# Patient Record
Sex: MALE | Race: WHITE | NOT HISPANIC OR LATINO | Employment: FULL TIME | URBAN - METROPOLITAN AREA
[De-identification: names, ages, dates, MRNs, and addresses within clinical notes are randomized per-mention and may not be internally consistent; named-entity substitution may affect disease eponyms.]

---

## 2018-01-16 ENCOUNTER — CONSULT (OUTPATIENT)
Dept: ONCOLOGY | Facility: CLINIC | Age: 43
End: 2018-01-16

## 2018-01-16 ENCOUNTER — LAB (OUTPATIENT)
Dept: LAB | Facility: HOSPITAL | Age: 43
End: 2018-01-16

## 2018-01-16 VITALS
HEART RATE: 91 BPM | WEIGHT: 283.2 LBS | RESPIRATION RATE: 16 BRPM | BODY MASS INDEX: 36.35 KG/M2 | OXYGEN SATURATION: 96 % | TEMPERATURE: 97.6 F | HEIGHT: 74 IN | SYSTOLIC BLOOD PRESSURE: 157 MMHG | DIASTOLIC BLOOD PRESSURE: 97 MMHG

## 2018-01-16 DIAGNOSIS — D72.825 BANDEMIA: Primary | ICD-10-CM

## 2018-01-16 DIAGNOSIS — D72.828 OTHER ELEVATED WHITE BLOOD CELL (WBC) COUNT: Primary | ICD-10-CM

## 2018-01-16 LAB
BASOPHILS # BLD AUTO: 0.06 10*3/MM3 (ref 0–0.2)
BASOPHILS NFR BLD AUTO: 0.5 % (ref 0–2)
DEPRECATED RDW RBC AUTO: 42.3 FL (ref 37–54)
EOSINOPHIL # BLD AUTO: 0.32 10*3/MM3 (ref 0.1–0.3)
EOSINOPHIL NFR BLD AUTO: 2.4 % (ref 0–4)
ERYTHROCYTE [DISTWIDTH] IN BLOOD BY AUTOMATED COUNT: 13 % (ref 11.5–14.5)
ERYTHROCYTE [SEDIMENTATION RATE] IN BLOOD: 4 MM/HR (ref 0–20)
HCT VFR BLD AUTO: 46.5 % (ref 42–52)
HGB BLD-MCNC: 16.5 G/DL (ref 14–18)
IMM GRANULOCYTES # BLD: 0.29 10*3/MM3 (ref 0–0.03)
IMM GRANULOCYTES NFR BLD: 2.2 % (ref 0–0.5)
LDH SERPL-CCNC: 197 U/L (ref 135–225)
LYMPHOCYTES # BLD AUTO: 2.92 10*3/MM3 (ref 0.6–4.8)
LYMPHOCYTES NFR BLD AUTO: 22 % (ref 20–45)
MCH RBC QN AUTO: 31.6 PG (ref 27–31)
MCHC RBC AUTO-ENTMCNC: 35.5 G/DL (ref 31–37)
MCV RBC AUTO: 89.1 FL (ref 80–94)
MONOCYTES # BLD AUTO: 0.77 10*3/MM3 (ref 0–1)
MONOCYTES NFR BLD AUTO: 5.8 % (ref 3–8)
NEUTROPHILS # BLD AUTO: 8.89 10*3/MM3 (ref 1.5–8.3)
NEUTROPHILS NFR BLD AUTO: 67.1 % (ref 45–70)
NRBC BLD MANUAL-RTO: 0 /100 WBC (ref 0–0)
PLATELET # BLD AUTO: 323 10*3/MM3 (ref 140–500)
PMV BLD AUTO: 9.1 FL (ref 7.4–10.4)
RBC # BLD AUTO: 5.22 10*6/MM3 (ref 4.7–6.1)
WBC NRBC COR # BLD: 13.25 10*3/MM3 (ref 4.8–10.8)

## 2018-01-16 PROCEDURE — 99244 OFF/OP CNSLTJ NEW/EST MOD 40: CPT | Performed by: INTERNAL MEDICINE

## 2018-01-16 PROCEDURE — 88184 FLOWCYTOMETRY/ TC 1 MARKER: CPT | Performed by: INTERNAL MEDICINE

## 2018-01-16 PROCEDURE — 88189 FLOWCYTOMETRY/READ 16 & >: CPT | Performed by: INTERNAL MEDICINE

## 2018-01-16 PROCEDURE — 83615 LACTATE (LD) (LDH) ENZYME: CPT | Performed by: INTERNAL MEDICINE

## 2018-01-16 PROCEDURE — 85025 COMPLETE CBC W/AUTO DIFF WBC: CPT | Performed by: INTERNAL MEDICINE

## 2018-01-16 PROCEDURE — 36415 COLL VENOUS BLD VENIPUNCTURE: CPT | Performed by: INTERNAL MEDICINE

## 2018-01-16 PROCEDURE — 88185 FLOWCYTOMETRY/TC ADD-ON: CPT | Performed by: INTERNAL MEDICINE

## 2018-01-16 PROCEDURE — 88275 CYTOGENETICS 100-300: CPT | Performed by: INTERNAL MEDICINE

## 2018-01-16 PROCEDURE — 85652 RBC SED RATE AUTOMATED: CPT | Performed by: INTERNAL MEDICINE

## 2018-01-16 PROCEDURE — 88271 CYTOGENETICS DNA PROBE: CPT | Performed by: INTERNAL MEDICINE

## 2018-01-16 RX ORDER — LEVOTHYROXINE SODIUM 0.03 MG/1
25 TABLET ORAL DAILY
COMMUNITY
Start: 2017-12-30 | End: 2018-06-04 | Stop reason: HOSPADM

## 2018-01-16 RX ORDER — AMLODIPINE BESYLATE 10 MG/1
10 TABLET ORAL DAILY
COMMUNITY
End: 2020-07-28

## 2018-01-16 RX ORDER — LORATADINE 10 MG/1
10 TABLET ORAL DAILY
COMMUNITY
Start: 2018-01-10 | End: 2020-07-28

## 2018-01-16 RX ORDER — FENOFIBRATE 160 MG/1
160 TABLET ORAL DAILY
COMMUNITY
Start: 2017-12-30 | End: 2020-07-28

## 2018-01-16 RX ORDER — LISINOPRIL 10 MG/1
10 TABLET ORAL DAILY
COMMUNITY
Start: 2018-01-10

## 2018-01-16 RX ORDER — HYDROCHLOROTHIAZIDE 12.5 MG/1
12.5 TABLET ORAL DAILY
COMMUNITY
Start: 2017-12-02 | End: 2018-06-04 | Stop reason: HOSPADM

## 2018-01-16 RX ORDER — LEVOTHYROXINE SODIUM 0.2 MG/1
200 TABLET ORAL DAILY
COMMUNITY
Start: 2017-12-30 | End: 2018-06-04 | Stop reason: HOSPADM

## 2018-01-16 RX ORDER — TESTOSTERONE CYPIONATE 200 MG/ML
INJECTION, SOLUTION INTRAMUSCULAR
Status: ON HOLD | COMMUNITY
Start: 2017-12-06 | End: 2018-05-29

## 2018-01-16 RX ORDER — DULOXETIN HYDROCHLORIDE 60 MG/1
60 CAPSULE, DELAYED RELEASE ORAL DAILY
COMMUNITY
Start: 2018-01-10 | End: 2020-07-28

## 2018-01-16 NOTE — PROGRESS NOTES
Subjective     REASON FOR CONSULTATION:  leukocytosis  Provide an opinion on any further workup or treatment                             REQUESTING PRACTITIONER:  Arpan    RECORDS OBTAINED:  Records of the patients history including those obtained from the referring provider were reviewed and summarized in detail.      History of Present Illness   This is a 42-year-old man with obesity, diabetes mellitus, hyper-cholesterolemia, reported history of rheumatoid arthritis and ongoing tobacco abuse.  He is seen for evaluation of mild leukocytosis.  A CBC reviewed from 9/14/17 inserted a white blood cell count of 13.0 with mild neutrophilia, no polycythemia or thrombocytosis.  A repeat CBC on 12/6/17 again showed a mild leukocytosis with white blood cell count of 14.25 again with mild neutrophilia.  The patient has had no recent significant infections.  He denies fever.  Denies skin rash.  He complains of fluctuating weight and fatigue.  He complains of significant joint discomfort most prominently affecting the hands, elbows, shoulders and hips.  He states he was diagnosed with rheumatoid arthritis for 5 years ago but has never been formally evaluated by rheumatology.  He currently smokes one to 2 packs of tobacco a day and has smoked since the age of 17.  He denies significant shortness of breath but has a chronic nonproductive cough.    Past Medical History:   Diagnosis Date   • Adjustment disorder with mixed anxiety and depressed mood    • Diabetes mellitus     Type 2 w/o complications   • Fatigue    • H/O Testicular hypofunction    • Hypertension    • Hypothyroidism    • Morbid obesity    • Osteoarthritis     Knee   • Rheumatoid arthritis    • Sleep apnea    • Vitamin D deficiency         Past Surgical History:   Procedure Laterality Date   • APPENDECTOMY  2003   • FOOT NEUROMA SURGERY Right 2016, 2017        No current outpatient prescriptions on file prior to visit.     No current facility-administered  "medications on file prior to visit.         ALLERGIES:    Allergies   Allergen Reactions   • Penicillins      Respiratory distress        Social History     Social History   • Marital status:      Spouse name: Mei   • Number of children: 3   • Years of education: GED     Occupational History   •  [employer]     Social History Main Topics   • Smoking status: Current Every Day Smoker     Packs/day: 1.00     Types: Cigarettes   • Smokeless tobacco: None      Comment: Started at age 17   • Alcohol use No   • Drug use: No   • Sexual activity: Not Asked     Other Topics Concern   • None     Social History Narrative        Family History   Problem Relation Age of Onset   • No Known Problems Sister    • No Known Problems Daughter    • No Known Problems Son    • No Known Problems Sister    • No Known Problems Daughter         Review of Systems   Constitutional: Positive for fatigue. Negative for diaphoresis, fever and unexpected weight change.   HENT: Negative.    Respiratory: Positive for cough.    Gastrointestinal: Negative.    Genitourinary: Positive for difficulty urinating and frequency.   Musculoskeletal: Positive for arthralgias and joint swelling.   Skin: Negative.    Neurological: Negative.    Hematological: Negative.    Psychiatric/Behavioral: Negative.          Objective     Vitals:    01/16/18 1412   BP: 157/97   Pulse: 91   Resp: 16   Temp: 97.6 °F (36.4 °C)   TempSrc: Oral   SpO2: 96%   Weight: 128 kg (283 lb 3.2 oz)   Height: 187 cm (73.62\")   PainSc: 2  Comment: right elbow     Current Status 1/16/2018   ECOG score 0       Physical Exam    HEENT: no icterus, no thrush, alopecia noted  NECK: thick, no jvd  LN: no cervical, supraclavicular lad  CHEST: CTA bilat, no wheezing  CV: RRR, S1S2  ABD: obese, soft, nontender, nondistended, no masses  MS: no edema  SKIN: no petechiae or bruising  NEURO: normal strength, alert and oriented    RECENT LABS:  Hematology WBC   Date Value Ref Range " Status   01/16/2018 13.25 (H) 4.80 - 10.80 10*3/mm3 Final     RBC   Date Value Ref Range Status   01/16/2018 5.22 4.70 - 6.10 10*6/mm3 Final     Hemoglobin   Date Value Ref Range Status   01/16/2018 16.5 14.0 - 18.0 g/dL Final     Hematocrit   Date Value Ref Range Status   01/16/2018 46.5 42.0 - 52.0 % Final     Platelets   Date Value Ref Range Status   01/16/2018 323 140 - 500 10*3/mm3 Final          Assessment/Plan     This is a 42-year-old man with mild leukocytosis in the absence of polycythemia or thrombocytosis.  The white blood cell differential today shows slight neutrophilia 8.8 and slight increase in eosinophils 0.32 and immature grand 0.29.  The differential diagnosis in this setting would be very broad and most likely not related to an underlying bone marrow disorder.  The patient has metabolic syndrome, reported history of rheumatoid arthritis, and smokes 1-2 packs of tobacco a day any of which could cause a leukocytosis of this degree.    To further exclude primary bone marrow disorders, I recommended that we check a peripheral blood flow cytometry and FISH for BCR-ABL given the chronicity of the leukocytosis.  I will also check a sedimentation rate an LDH.  The patient wanted to be called with results which I will do.    I strongly advised the patient to stop smoking, work on weight control and address but of his metabolic syndrome.  I recommended evaluation by rheumatology to confirm the diagnosis of rheumatoid arthritis and treat if indicated.    Addendum: Labs reviewed including sedimentation rate, LDH, ACR-ABL and peripheral blood flow cytometry are unremarkable with no evidence of a myeloid or lymphoid disorder.  The patient will be updated by phone negative results.

## 2018-01-31 ENCOUNTER — TELEPHONE (OUTPATIENT)
Dept: ONCOLOGY | Facility: HOSPITAL | Age: 43
End: 2018-01-31

## 2018-01-31 NOTE — TELEPHONE ENCOUNTER
----- Message from Matthew Hannon MD sent at 1/31/2018 10:44 AM EST -----  Please let this patient know that all of his blood testing came back normal other than the mild elevated white blood cell count which he is aware of.        Tried calling pt at both numbers. Went straight to voicemail and states that the mailbox hasnt been set up.

## 2018-02-12 LAB
REF LAB TEST METHOD: NORMAL
REF LAB TEST METHOD: NORMAL

## 2018-05-28 ENCOUNTER — APPOINTMENT (OUTPATIENT)
Dept: CT IMAGING | Facility: HOSPITAL | Age: 43
End: 2018-05-28

## 2018-05-28 ENCOUNTER — HOSPITAL ENCOUNTER (OUTPATIENT)
Facility: HOSPITAL | Age: 43
Setting detail: OBSERVATION
End: 2018-05-29
Attending: EMERGENCY MEDICINE | Admitting: INTERNAL MEDICINE

## 2018-05-28 ENCOUNTER — APPOINTMENT (OUTPATIENT)
Dept: GENERAL RADIOLOGY | Facility: HOSPITAL | Age: 43
End: 2018-05-28

## 2018-05-28 DIAGNOSIS — R04.2 HEMOPTYSIS: Primary | ICD-10-CM

## 2018-05-28 DIAGNOSIS — I31.39 PERICARDIAL EFFUSION: ICD-10-CM

## 2018-05-28 LAB
ALBUMIN SERPL-MCNC: 5 G/DL (ref 3.5–5.2)
ALBUMIN/GLOB SERPL: 2.5 G/DL
ALP SERPL-CCNC: 60 U/L (ref 40–129)
ALT SERPL W P-5'-P-CCNC: 67 U/L (ref 5–41)
ANION GAP SERPL CALCULATED.3IONS-SCNC: 13.2 MMOL/L
AST SERPL-CCNC: 68 U/L (ref 5–40)
BASOPHILS # BLD AUTO: 0.08 10*3/MM3 (ref 0–0.2)
BASOPHILS NFR BLD AUTO: 0.6 % (ref 0–2)
BILIRUB SERPL-MCNC: 0.8 MG/DL (ref 0.2–1.2)
BUN BLD-MCNC: 14 MG/DL (ref 6–20)
BUN/CREAT SERPL: 9.5 (ref 7–25)
CALCIUM SPEC-SCNC: 10 MG/DL (ref 8.6–10.5)
CHLORIDE SERPL-SCNC: 99 MMOL/L (ref 98–107)
CO2 SERPL-SCNC: 31.8 MMOL/L (ref 22–29)
CREAT BLD-MCNC: 1.48 MG/DL (ref 0.76–1.27)
D DIMER PPP FEU-MCNC: <0.3 MCGFEU/ML (ref 0–0.46)
DEPRECATED RDW RBC AUTO: 46.5 FL (ref 37–54)
EOSINOPHIL # BLD AUTO: 0.5 10*3/MM3 (ref 0.1–0.3)
EOSINOPHIL NFR BLD AUTO: 3.8 % (ref 0–4)
ERYTHROCYTE [DISTWIDTH] IN BLOOD BY AUTOMATED COUNT: 13.3 % (ref 11.5–14.5)
GFR SERPL CREATININE-BSD FRML MDRD: 52 ML/MIN/1.73
GLOBULIN UR ELPH-MCNC: 2 GM/DL
GLUCOSE BLD-MCNC: 158 MG/DL (ref 65–99)
GLUCOSE BLDC GLUCOMTR-MCNC: 157 MG/DL (ref 70–130)
HCT VFR BLD AUTO: 44.8 % (ref 42–52)
HGB BLD-MCNC: 15.6 G/DL (ref 14–18)
IMM GRANULOCYTES # BLD: 0.07 10*3/MM3 (ref 0–0.03)
IMM GRANULOCYTES NFR BLD: 0.5 % (ref 0–0.5)
LYMPHOCYTES # BLD AUTO: 3.57 10*3/MM3 (ref 0.6–4.8)
LYMPHOCYTES NFR BLD AUTO: 27.4 % (ref 20–45)
MCH RBC QN AUTO: 33.1 PG (ref 27–31)
MCHC RBC AUTO-ENTMCNC: 34.8 G/DL (ref 31–37)
MCV RBC AUTO: 94.9 FL (ref 80–94)
MONOCYTES # BLD AUTO: 0.61 10*3/MM3 (ref 0–1)
MONOCYTES NFR BLD AUTO: 4.7 % (ref 3–8)
NEUTROPHILS # BLD AUTO: 8.18 10*3/MM3 (ref 1.5–8.3)
NEUTROPHILS NFR BLD AUTO: 63 % (ref 45–70)
NRBC BLD MANUAL-RTO: 0 /100 WBC (ref 0–0)
NT-PROBNP SERPL-MCNC: 81 PG/ML (ref 5–125)
PLATELET # BLD AUTO: 284 10*3/MM3 (ref 140–500)
PMV BLD AUTO: 9.8 FL (ref 7.4–10.4)
POTASSIUM BLD-SCNC: 4.1 MMOL/L (ref 3.5–5.2)
PROT SERPL-MCNC: 7 G/DL (ref 6–8.5)
RBC # BLD AUTO: 4.72 10*6/MM3 (ref 4.7–6.1)
SODIUM BLD-SCNC: 144 MMOL/L (ref 136–145)
TROPONIN T SERPL-MCNC: 0.02 NG/ML (ref 0–0.03)
WBC NRBC COR # BLD: 13.01 10*3/MM3 (ref 4.8–10.8)

## 2018-05-28 PROCEDURE — G0378 HOSPITAL OBSERVATION PER HR: HCPCS

## 2018-05-28 PROCEDURE — 93010 ELECTROCARDIOGRAM REPORT: CPT | Performed by: INTERNAL MEDICINE

## 2018-05-28 PROCEDURE — 84484 ASSAY OF TROPONIN QUANT: CPT | Performed by: EMERGENCY MEDICINE

## 2018-05-28 PROCEDURE — 99284 EMERGENCY DEPT VISIT MOD MDM: CPT

## 2018-05-28 PROCEDURE — 83880 ASSAY OF NATRIURETIC PEPTIDE: CPT | Performed by: EMERGENCY MEDICINE

## 2018-05-28 PROCEDURE — 85379 FIBRIN DEGRADATION QUANT: CPT | Performed by: EMERGENCY MEDICINE

## 2018-05-28 PROCEDURE — 82962 GLUCOSE BLOOD TEST: CPT

## 2018-05-28 PROCEDURE — 99285 EMERGENCY DEPT VISIT HI MDM: CPT | Performed by: EMERGENCY MEDICINE

## 2018-05-28 PROCEDURE — 85025 COMPLETE CBC W/AUTO DIFF WBC: CPT | Performed by: EMERGENCY MEDICINE

## 2018-05-28 PROCEDURE — 0 IOPAMIDOL PER 1 ML: Performed by: EMERGENCY MEDICINE

## 2018-05-28 PROCEDURE — 96360 HYDRATION IV INFUSION INIT: CPT

## 2018-05-28 PROCEDURE — 80053 COMPREHEN METABOLIC PANEL: CPT | Performed by: EMERGENCY MEDICINE

## 2018-05-28 PROCEDURE — 94799 UNLISTED PULMONARY SVC/PX: CPT

## 2018-05-28 PROCEDURE — 71046 X-RAY EXAM CHEST 2 VIEWS: CPT

## 2018-05-28 PROCEDURE — 99220 PR INITIAL OBSERVATION CARE/DAY 70 MINUTES: CPT | Performed by: INTERNAL MEDICINE

## 2018-05-28 PROCEDURE — 71275 CT ANGIOGRAPHY CHEST: CPT

## 2018-05-28 PROCEDURE — 96361 HYDRATE IV INFUSION ADD-ON: CPT

## 2018-05-28 PROCEDURE — 93005 ELECTROCARDIOGRAM TRACING: CPT | Performed by: EMERGENCY MEDICINE

## 2018-05-28 RX ORDER — ONDANSETRON 2 MG/ML
4 INJECTION INTRAMUSCULAR; INTRAVENOUS EVERY 6 HOURS PRN
Status: DISCONTINUED | OUTPATIENT
Start: 2018-05-28 | End: 2018-05-29 | Stop reason: HOSPADM

## 2018-05-28 RX ORDER — ACETAMINOPHEN 325 MG/1
650 TABLET ORAL EVERY 4 HOURS PRN
Status: DISCONTINUED | OUTPATIENT
Start: 2018-05-28 | End: 2018-05-29 | Stop reason: HOSPADM

## 2018-05-28 RX ORDER — NICOTINE POLACRILEX 4 MG
15 LOZENGE BUCCAL
Status: DISCONTINUED | OUTPATIENT
Start: 2018-05-28 | End: 2018-05-29 | Stop reason: HOSPADM

## 2018-05-28 RX ORDER — ALUMINA, MAGNESIA, AND SIMETHICONE 2400; 2400; 240 MG/30ML; MG/30ML; MG/30ML
15 SUSPENSION ORAL EVERY 6 HOURS PRN
Status: DISCONTINUED | OUTPATIENT
Start: 2018-05-28 | End: 2018-05-29 | Stop reason: HOSPADM

## 2018-05-28 RX ORDER — DEXTROSE MONOHYDRATE 25 G/50ML
25 INJECTION, SOLUTION INTRAVENOUS
Status: DISCONTINUED | OUTPATIENT
Start: 2018-05-28 | End: 2018-05-29 | Stop reason: HOSPADM

## 2018-05-28 RX ORDER — SODIUM CHLORIDE 0.9 % (FLUSH) 0.9 %
10 SYRINGE (ML) INJECTION AS NEEDED
Status: DISCONTINUED | OUTPATIENT
Start: 2018-05-28 | End: 2018-05-29 | Stop reason: HOSPADM

## 2018-05-28 RX ORDER — ACETAMINOPHEN 160 MG/5ML
650 SOLUTION ORAL EVERY 4 HOURS PRN
Status: DISCONTINUED | OUTPATIENT
Start: 2018-05-28 | End: 2018-05-29 | Stop reason: HOSPADM

## 2018-05-28 RX ORDER — SODIUM CHLORIDE 0.9 % (FLUSH) 0.9 %
1-10 SYRINGE (ML) INJECTION AS NEEDED
Status: DISCONTINUED | OUTPATIENT
Start: 2018-05-28 | End: 2018-05-29 | Stop reason: HOSPADM

## 2018-05-28 RX ORDER — SODIUM CHLORIDE 9 MG/ML
40 INJECTION, SOLUTION INTRAVENOUS AS NEEDED
Status: DISCONTINUED | OUTPATIENT
Start: 2018-05-28 | End: 2018-05-29 | Stop reason: HOSPADM

## 2018-05-28 RX ORDER — NICOTINE 21 MG/24HR
1 PATCH, TRANSDERMAL 24 HOURS TRANSDERMAL EVERY 24 HOURS
Status: DISCONTINUED | OUTPATIENT
Start: 2018-05-28 | End: 2018-05-29 | Stop reason: HOSPADM

## 2018-05-28 RX ADMIN — IOPAMIDOL 100 ML: 755 INJECTION, SOLUTION INTRAVENOUS at 21:03

## 2018-05-28 RX ADMIN — IOPAMIDOL 50 ML: 755 INJECTION, SOLUTION INTRAVENOUS at 21:03

## 2018-05-28 RX ADMIN — SODIUM CHLORIDE 1000 ML: 9 INJECTION, SOLUTION INTRAVENOUS at 19:45

## 2018-05-29 ENCOUNTER — APPOINTMENT (OUTPATIENT)
Dept: CARDIOLOGY | Facility: HOSPITAL | Age: 43
End: 2018-05-29
Attending: INTERNAL MEDICINE

## 2018-05-29 ENCOUNTER — HOSPITAL ENCOUNTER (INPATIENT)
Facility: HOSPITAL | Age: 43
LOS: 6 days | Discharge: HOME | End: 2018-06-04
Attending: INTERNAL MEDICINE | Admitting: INTERNAL MEDICINE

## 2018-05-29 VITALS
RESPIRATION RATE: 16 BRPM | HEART RATE: 87 BPM | BODY MASS INDEX: 37.77 KG/M2 | SYSTOLIC BLOOD PRESSURE: 151 MMHG | HEIGHT: 73 IN | OXYGEN SATURATION: 98 % | WEIGHT: 285 LBS | DIASTOLIC BLOOD PRESSURE: 94 MMHG | TEMPERATURE: 98.2 F

## 2018-05-29 DIAGNOSIS — G47.33 OBSTRUCTIVE SLEEP APNEA SYNDROME: Primary | ICD-10-CM

## 2018-05-29 DIAGNOSIS — G47.34 NOCTURNAL HYPOXEMIA: ICD-10-CM

## 2018-05-29 PROBLEM — I42.9 CARDIOMYOPATHY (HCC): Status: ACTIVE | Noted: 2018-05-29

## 2018-05-29 PROBLEM — R07.9 CHEST PAIN: Status: ACTIVE | Noted: 2018-05-29

## 2018-05-29 PROBLEM — R04.2 HEMOPTYSIS: Status: ACTIVE | Noted: 2018-05-29

## 2018-05-29 PROBLEM — E11.9 TYPE 2 DIABETES MELLITUS (HCC): Status: ACTIVE | Noted: 2018-05-29

## 2018-05-29 PROBLEM — E03.9 ACQUIRED HYPOTHYROIDISM: Status: ACTIVE | Noted: 2018-05-29

## 2018-05-29 PROBLEM — N17.9 AKI (ACUTE KIDNEY INJURY) (HCC): Status: ACTIVE | Noted: 2018-05-29

## 2018-05-29 PROBLEM — D72.825 BANDEMIA: Status: RESOLVED | Noted: 2018-01-16 | Resolved: 2018-05-29

## 2018-05-29 PROBLEM — L63.0 ALOPECIA TOTALIS: Status: ACTIVE | Noted: 2018-05-29

## 2018-05-29 PROBLEM — M06.9 RHEUMATOID ARTHRITIS (HCC): Status: ACTIVE | Noted: 2018-05-29

## 2018-05-29 LAB
ANION GAP SERPL CALCULATED.3IONS-SCNC: 10.3 MMOL/L
ANION GAP SERPL CALCULATED.3IONS-SCNC: 9.3 MMOL/L
AORTIC ARCH: 3 CM
ASCENDING AORTA: 2.8 CM
B PERT DNA SPEC QL NAA+PROBE: NOT DETECTED
BASOPHILS # BLD AUTO: 0.07 10*3/MM3 (ref 0–0.2)
BASOPHILS NFR BLD AUTO: 0.5 % (ref 0–2)
BH CV ECHO MEAS - AO MAX PG: 4 MMHG
BH CV ECHO MEAS - AO MEAN PG: 2 MMHG
BH CV ECHO MEAS - AO V2 MAX: 97 CM/SEC
BH CV ECHO MEAS - AO V2 VTI: 17 CM
BH CV ECHO MEAS - EDV(MOD-SP2): 120 ML
BH CV ECHO MEAS - EDV(MOD-SP4): 115 ML
BH CV ECHO MEAS - EF(MOD-BP): 30 %
BH CV ECHO MEAS - EF(MOD-SP4): 31 %
BH CV ECHO MEAS - ESV(MOD-SP2): 71 ML
BH CV ECHO MEAS - ESV(MOD-SP4): 80 ML
BH CV ECHO MEAS - FS: 15 %
BH CV ECHO MEAS - IVS/LVPW: 1 CM
BH CV ECHO MEAS - IVSD: 1.4 CM
BH CV ECHO MEAS - LAT PEAK E' VEL: 4 CM/SEC
BH CV ECHO MEAS - LV MASS(C)D: 287.8 GRAMS
BH CV ECHO MEAS - LV MAX PG: 1.7 MMHG
BH CV ECHO MEAS - LV MEAN PG: 1 MMHG
BH CV ECHO MEAS - LV V1 MAX: 66 CM/SEC
BH CV ECHO MEAS - LV V1 VTI: 12 CM
BH CV ECHO MEAS - LVIDD: 4.9 CM
BH CV ECHO MEAS - LVIDS: 3.6 CM
BH CV ECHO MEAS - LVOT AREA: 4.2 CM2
BH CV ECHO MEAS - LVOT DIAM: 2.3 CM
BH CV ECHO MEAS - LVPWD: 1.4 CM
BH CV ECHO MEAS - MED PEAK E' VEL: 4 CM/SEC
BH CV ECHO MEAS - MV A DUR: 113 SEC
BH CV ECHO MEAS - MV A MAX VEL: 38 CM/SEC
BH CV ECHO MEAS - MV DEC SLOPE: 301 CM/SEC2
BH CV ECHO MEAS - MV DEC TIME: 113 MSEC
BH CV ECHO MEAS - MV E MAX VEL: 45 CM/SEC
BH CV ECHO MEAS - MV E/A: 1.2
BH CV ECHO MEAS - MV MAX PG: 1 MMHG
BH CV ECHO MEAS - MV MEAN PG: 1 MMHG
BH CV ECHO MEAS - MV P1/2T: 57 MSEC
BH CV ECHO MEAS - MV V2 VTI: 16 CM
BH CV ECHO MEAS - MVA(P1/2T): 3.9 CM2
BH CV ECHO MEAS - PA ACC TIME: 0 SEC
BH CV ECHO MEAS - PA V2 MAX: 69 CM/SEC
BH CV ECHO MEAS - PULM A REVS DUR: 95 SEC
BH CV ECHO MEAS - PULM A REVS VEL: 22 CM/SEC
BH CV ECHO MEAS - PULM DIAS VEL: 29 CM/SEC
BH CV ECHO MEAS - PULM S/D: 1.6
BH CV ECHO MEAS - PULM SYS VEL: 46 CM/SEC
BH CV ECHO MEAS - RV MAX PG: 1 MMHG
BH CV ECHO MEAS - RV V1 MAX: 37.7 CM/SEC
BH CV ECHO MEAS - RV V1 VTI: 8 CM
BH CV ECHO MEAS - RVOT DIAM: 2.7 CM
BH CV ECHO MEAS - TAPSE (>1.6): 2.01 CM2
BH CV ECHO MEASUREMENTS AVERAGE E/E' RATIO: 11.25
BH CV VAS BP RIGHT ARM: NORMAL MMHG
BH CV XLRA - RV BASE: 2.71 CM
BH CV XLRA - TDI S': 10.1 CM/SEC
BUN BLD-MCNC: 13 MG/DL (ref 6–20)
BUN BLD-MCNC: 15 MG/DL (ref 6–20)
BUN/CREAT SERPL: 8.9 (ref 7–25)
BUN/CREAT SERPL: 9.1 (ref 7–25)
C PNEUM DNA NPH QL NAA+NON-PROBE: NOT DETECTED
CALCIUM SPEC-SCNC: 9.3 MG/DL (ref 8.6–10.5)
CALCIUM SPEC-SCNC: 9.8 MG/DL (ref 8.6–10.5)
CHLORIDE SERPL-SCNC: 100 MMOL/L (ref 98–107)
CHLORIDE SERPL-SCNC: 99 MMOL/L (ref 98–107)
CO2 SERPL-SCNC: 30.7 MMOL/L (ref 22–29)
CO2 SERPL-SCNC: 33.7 MMOL/L (ref 22–29)
CREAT BLD-MCNC: 1.46 MG/DL (ref 0.76–1.27)
CREAT BLD-MCNC: 1.64 MG/DL (ref 0.76–1.27)
DEPRECATED RDW RBC AUTO: 48.5 FL (ref 37–54)
EOSINOPHIL # BLD AUTO: 0.59 10*3/MM3 (ref 0.1–0.3)
EOSINOPHIL NFR BLD AUTO: 4.5 % (ref 0–4)
ERYTHROCYTE [DISTWIDTH] IN BLOOD BY AUTOMATED COUNT: 13.6 % (ref 11.5–14.5)
FLUAV H1 2009 PAND RNA NPH QL NAA+PROBE: NOT DETECTED
FLUAV H1 HA GENE NPH QL NAA+PROBE: NOT DETECTED
FLUAV H3 RNA NPH QL NAA+PROBE: NOT DETECTED
FLUAV SUBTYP SPEC NAA+PROBE: NOT DETECTED
FLUBV RNA ISLT QL NAA+PROBE: NOT DETECTED
GFR SERPL CREATININE-BSD FRML MDRD: 46 ML/MIN/1.73
GFR SERPL CREATININE-BSD FRML MDRD: 53 ML/MIN/1.73
GLUCOSE BLD-MCNC: 135 MG/DL (ref 65–99)
GLUCOSE BLD-MCNC: 163 MG/DL (ref 65–99)
GLUCOSE BLDC GLUCOMTR-MCNC: 134 MG/DL (ref 70–130)
GLUCOSE BLDC GLUCOMTR-MCNC: 159 MG/DL (ref 70–130)
GLUCOSE BLDC GLUCOMTR-MCNC: 164 MG/DL (ref 70–130)
GLUCOSE BLDC GLUCOMTR-MCNC: 168 MG/DL (ref 70–130)
HADV DNA SPEC NAA+PROBE: NOT DETECTED
HBA1C MFR BLD: 7.7 % (ref 4.8–5.6)
HCOV 229E RNA SPEC QL NAA+PROBE: NOT DETECTED
HCOV HKU1 RNA SPEC QL NAA+PROBE: NOT DETECTED
HCOV NL63 RNA SPEC QL NAA+PROBE: NOT DETECTED
HCOV OC43 RNA SPEC QL NAA+PROBE: NOT DETECTED
HCT VFR BLD AUTO: 42.4 % (ref 42–52)
HGB BLD-MCNC: 14.6 G/DL (ref 14–18)
HMPV RNA NPH QL NAA+NON-PROBE: NOT DETECTED
HPIV1 RNA SPEC QL NAA+PROBE: NOT DETECTED
HPIV2 RNA SPEC QL NAA+PROBE: NOT DETECTED
HPIV3 RNA NPH QL NAA+PROBE: NOT DETECTED
HPIV4 P GENE NPH QL NAA+PROBE: NOT DETECTED
IMM GRANULOCYTES # BLD: 0.05 10*3/MM3 (ref 0–0.03)
IMM GRANULOCYTES NFR BLD: 0.4 % (ref 0–0.5)
INR PPP: 0.96 (ref 0.9–1.1)
LEFT ATRIUM VOLUME INDEX: 21 ML/M2
LYMPHOCYTES # BLD AUTO: 4.21 10*3/MM3 (ref 0.6–4.8)
LYMPHOCYTES NFR BLD AUTO: 32.5 % (ref 20–45)
M PNEUMO IGG SER IA-ACNC: NOT DETECTED
MAXIMAL PREDICTED HEART RATE: 178 BPM
MCH RBC QN AUTO: 33 PG (ref 27–31)
MCHC RBC AUTO-ENTMCNC: 34.4 G/DL (ref 31–37)
MCV RBC AUTO: 95.9 FL (ref 80–94)
MONOCYTES # BLD AUTO: 0.65 10*3/MM3 (ref 0–1)
MONOCYTES NFR BLD AUTO: 5 % (ref 3–8)
NEUTROPHILS # BLD AUTO: 7.4 10*3/MM3 (ref 1.5–8.3)
NEUTROPHILS NFR BLD AUTO: 57.1 % (ref 45–70)
NRBC BLD MANUAL-RTO: 0 /100 WBC (ref 0–0)
PLATELET # BLD AUTO: 242 10*3/MM3 (ref 140–500)
PMV BLD AUTO: 9.3 FL (ref 7.4–10.4)
POTASSIUM BLD-SCNC: 3.6 MMOL/L (ref 3.5–5.2)
POTASSIUM BLD-SCNC: 3.7 MMOL/L (ref 3.5–5.2)
PROCALCITONIN SERPL-MCNC: 0.1 NG/ML (ref 0.1–0.25)
PROTHROMBIN TIME: 12.8 SECONDS (ref 12.1–15)
RBC # BLD AUTO: 4.42 10*6/MM3 (ref 4.7–6.1)
RHINOVIRUS RNA SPEC NAA+PROBE: NOT DETECTED
RSV RNA NPH QL NAA+NON-PROBE: NOT DETECTED
SODIUM BLD-SCNC: 140 MMOL/L (ref 136–145)
SODIUM BLD-SCNC: 143 MMOL/L (ref 136–145)
STRESS TARGET HR: 151 BPM
T4 FREE SERPL-MCNC: <0.1 NG/DL (ref 0.93–1.7)
TROPONIN T SERPL-MCNC: 0.02 NG/ML (ref 0–0.03)
TSH SERPL DL<=0.05 MIU/L-ACNC: >100.01 MIU/ML (ref 0.27–4.2)
WBC NRBC COR # BLD: 12.97 10*3/MM3 (ref 4.8–10.8)

## 2018-05-29 PROCEDURE — 83036 HEMOGLOBIN GLYCOSYLATED A1C: CPT | Performed by: INTERNAL MEDICINE

## 2018-05-29 PROCEDURE — 87486 CHLMYD PNEUM DNA AMP PROBE: CPT | Performed by: INTERNAL MEDICINE

## 2018-05-29 PROCEDURE — 99244 OFF/OP CNSLTJ NEW/EST MOD 40: CPT | Performed by: INTERNAL MEDICINE

## 2018-05-29 PROCEDURE — 87798 DETECT AGENT NOS DNA AMP: CPT | Performed by: INTERNAL MEDICINE

## 2018-05-29 PROCEDURE — 0399T ADULT TRANSTHORACIC ECHO COMPLETE W/ CONT IF NECESSARY PER PROTOCOL: CPT | Performed by: INTERNAL MEDICINE

## 2018-05-29 PROCEDURE — 85610 PROTHROMBIN TIME: CPT | Performed by: INTERNAL MEDICINE

## 2018-05-29 PROCEDURE — 93306 TTE W/DOPPLER COMPLETE: CPT

## 2018-05-29 PROCEDURE — 87633 RESP VIRUS 12-25 TARGETS: CPT | Performed by: INTERNAL MEDICINE

## 2018-05-29 PROCEDURE — 25010000002 ENOXAPARIN PER 10 MG: Performed by: INTERNAL MEDICINE

## 2018-05-29 PROCEDURE — 93306 TTE W/DOPPLER COMPLETE: CPT | Performed by: INTERNAL MEDICINE

## 2018-05-29 PROCEDURE — 0399T HC MYOCARDL STRAIN IMAG QUAN ASSMT PER SESS: CPT

## 2018-05-29 PROCEDURE — 82962 GLUCOSE BLOOD TEST: CPT

## 2018-05-29 PROCEDURE — 84439 ASSAY OF FREE THYROXINE: CPT | Performed by: INTERNAL MEDICINE

## 2018-05-29 PROCEDURE — 84484 ASSAY OF TROPONIN QUANT: CPT | Performed by: INTERNAL MEDICINE

## 2018-05-29 PROCEDURE — 63710000001 INSULIN ASPART PER 5 UNITS: Performed by: INTERNAL MEDICINE

## 2018-05-29 PROCEDURE — 85025 COMPLETE CBC W/AUTO DIFF WBC: CPT | Performed by: INTERNAL MEDICINE

## 2018-05-29 PROCEDURE — 99222 1ST HOSP IP/OBS MODERATE 55: CPT | Performed by: INTERNAL MEDICINE

## 2018-05-29 PROCEDURE — 80048 BASIC METABOLIC PNL TOTAL CA: CPT | Performed by: INTERNAL MEDICINE

## 2018-05-29 PROCEDURE — 63710000001 INSULIN DETEMER PER 5 UNITS: Performed by: INTERNAL MEDICINE

## 2018-05-29 PROCEDURE — 84145 PROCALCITONIN (PCT): CPT | Performed by: INTERNAL MEDICINE

## 2018-05-29 PROCEDURE — 87581 M.PNEUMON DNA AMP PROBE: CPT | Performed by: INTERNAL MEDICINE

## 2018-05-29 PROCEDURE — 99217 PR OBSERVATION CARE DISCHARGE MANAGEMENT: CPT | Performed by: INTERNAL MEDICINE

## 2018-05-29 PROCEDURE — G0378 HOSPITAL OBSERVATION PER HR: HCPCS

## 2018-05-29 PROCEDURE — 84443 ASSAY THYROID STIM HORMONE: CPT | Performed by: INTERNAL MEDICINE

## 2018-05-29 RX ORDER — BISACODYL 10 MG
10 SUPPOSITORY, RECTAL RECTAL DAILY PRN
Status: DISCONTINUED | OUTPATIENT
Start: 2018-05-29 | End: 2018-06-04 | Stop reason: HOSPADM

## 2018-05-29 RX ORDER — CETIRIZINE HYDROCHLORIDE 10 MG/1
10 TABLET ORAL DAILY
Status: CANCELLED | OUTPATIENT
Start: 2018-05-29

## 2018-05-29 RX ORDER — SODIUM CHLORIDE 0.9 % (FLUSH) 0.9 %
1-10 SYRINGE (ML) INJECTION AS NEEDED
Status: DISCONTINUED | OUTPATIENT
Start: 2018-05-29 | End: 2018-06-04 | Stop reason: HOSPADM

## 2018-05-29 RX ORDER — CETIRIZINE HYDROCHLORIDE 10 MG/1
10 TABLET ORAL DAILY
Status: DISCONTINUED | OUTPATIENT
Start: 2018-05-29 | End: 2018-06-04 | Stop reason: HOSPADM

## 2018-05-29 RX ORDER — LEVOTHYROXINE SODIUM ANHYDROUS 100 UG/5ML
175 INJECTION, POWDER, LYOPHILIZED, FOR SOLUTION INTRAVENOUS
Status: DISCONTINUED | OUTPATIENT
Start: 2018-05-29 | End: 2018-05-31

## 2018-05-29 RX ORDER — NICOTINE POLACRILEX 4 MG
15 LOZENGE BUCCAL
Status: CANCELLED | OUTPATIENT
Start: 2018-05-29

## 2018-05-29 RX ORDER — DEXTROSE MONOHYDRATE 25 G/50ML
25 INJECTION, SOLUTION INTRAVENOUS
Status: DISCONTINUED | OUTPATIENT
Start: 2018-05-29 | End: 2018-06-04 | Stop reason: HOSPADM

## 2018-05-29 RX ORDER — DULOXETIN HYDROCHLORIDE 60 MG/1
60 CAPSULE, DELAYED RELEASE ORAL DAILY
Status: DISCONTINUED | OUTPATIENT
Start: 2018-05-29 | End: 2018-06-04 | Stop reason: HOSPADM

## 2018-05-29 RX ORDER — AMLODIPINE BESYLATE 5 MG/1
10 TABLET ORAL DAILY
Status: CANCELLED | OUTPATIENT
Start: 2018-05-29

## 2018-05-29 RX ORDER — NICOTINE POLACRILEX 4 MG
15 LOZENGE BUCCAL
Status: DISCONTINUED | OUTPATIENT
Start: 2018-05-29 | End: 2018-06-04 | Stop reason: HOSPADM

## 2018-05-29 RX ORDER — ALUMINA, MAGNESIA, AND SIMETHICONE 2400; 2400; 240 MG/30ML; MG/30ML; MG/30ML
15 SUSPENSION ORAL EVERY 6 HOURS PRN
Status: CANCELLED | OUTPATIENT
Start: 2018-05-29

## 2018-05-29 RX ORDER — NICOTINE 21 MG/24HR
1 PATCH, TRANSDERMAL 24 HOURS TRANSDERMAL EVERY 24 HOURS
Status: CANCELLED | OUTPATIENT
Start: 2018-05-29

## 2018-05-29 RX ORDER — SODIUM CHLORIDE 0.9 % (FLUSH) 0.9 %
1-10 SYRINGE (ML) INJECTION AS NEEDED
Status: CANCELLED | OUTPATIENT
Start: 2018-05-29

## 2018-05-29 RX ORDER — FAMOTIDINE 40 MG/1
40 TABLET, FILM COATED ORAL DAILY
Status: DISCONTINUED | OUTPATIENT
Start: 2018-05-29 | End: 2018-06-04 | Stop reason: HOSPADM

## 2018-05-29 RX ORDER — LIOTHYRONINE SODIUM 5 UG/1
10 TABLET ORAL 2 TIMES DAILY WITH MEALS
Status: DISCONTINUED | OUTPATIENT
Start: 2018-05-29 | End: 2018-05-31

## 2018-05-29 RX ORDER — NICOTINE 21 MG/24HR
1 PATCH, TRANSDERMAL 24 HOURS TRANSDERMAL EVERY 24 HOURS
Status: DISCONTINUED | OUTPATIENT
Start: 2018-05-29 | End: 2018-06-04 | Stop reason: HOSPADM

## 2018-05-29 RX ORDER — AMLODIPINE BESYLATE 10 MG/1
10 TABLET ORAL DAILY
Status: DISCONTINUED | OUTPATIENT
Start: 2018-05-29 | End: 2018-06-04 | Stop reason: HOSPADM

## 2018-05-29 RX ORDER — SODIUM CHLORIDE 0.9 % (FLUSH) 0.9 %
10 SYRINGE (ML) INJECTION AS NEEDED
Status: CANCELLED | OUTPATIENT
Start: 2018-05-29

## 2018-05-29 RX ORDER — SODIUM CHLORIDE 9 MG/ML
40 INJECTION, SOLUTION INTRAVENOUS AS NEEDED
Status: CANCELLED | OUTPATIENT
Start: 2018-05-29

## 2018-05-29 RX ORDER — ONDANSETRON 2 MG/ML
4 INJECTION INTRAMUSCULAR; INTRAVENOUS EVERY 6 HOURS PRN
Status: CANCELLED | OUTPATIENT
Start: 2018-05-29

## 2018-05-29 RX ORDER — ACETAMINOPHEN 160 MG/5ML
650 SOLUTION ORAL EVERY 4 HOURS PRN
Status: CANCELLED | OUTPATIENT
Start: 2018-05-29

## 2018-05-29 RX ORDER — DULOXETIN HYDROCHLORIDE 60 MG/1
60 CAPSULE, DELAYED RELEASE ORAL DAILY
Status: CANCELLED | OUTPATIENT
Start: 2018-05-29

## 2018-05-29 RX ORDER — DEXTROSE MONOHYDRATE 25 G/50ML
25 INJECTION, SOLUTION INTRAVENOUS
Status: CANCELLED | OUTPATIENT
Start: 2018-05-29

## 2018-05-29 RX ORDER — ACETAMINOPHEN 325 MG/1
650 TABLET ORAL EVERY 4 HOURS PRN
Status: CANCELLED | OUTPATIENT
Start: 2018-05-29

## 2018-05-29 RX ADMIN — NICOTINE 1 PATCH: 21 PATCH, EXTENDED RELEASE TRANSDERMAL at 21:45

## 2018-05-29 RX ADMIN — FAMOTIDINE 40 MG: 40 TABLET, FILM COATED ORAL at 15:37

## 2018-05-29 RX ADMIN — LEVOTHYROXINE SODIUM ANHYDROUS 175 MCG: 100 INJECTION, POWDER, LYOPHILIZED, FOR SOLUTION INTRAVENOUS at 17:05

## 2018-05-29 RX ADMIN — DULOXETINE HYDROCHLORIDE 60 MG: 60 CAPSULE, DELAYED RELEASE ORAL at 15:37

## 2018-05-29 RX ADMIN — LIOTHYRONINE SODIUM 10 MCG: 5 TABLET ORAL at 21:46

## 2018-05-29 RX ADMIN — CETIRIZINE HYDROCHLORIDE 10 MG: 10 TABLET, FILM COATED ORAL at 15:37

## 2018-05-29 RX ADMIN — AMLODIPINE BESYLATE 10 MG: 10 TABLET ORAL at 15:36

## 2018-05-29 RX ADMIN — INSULIN ASPART 2 UNITS: 100 INJECTION, SOLUTION INTRAVENOUS; SUBCUTANEOUS at 21:48

## 2018-05-29 RX ADMIN — ENOXAPARIN SODIUM 30 MG: 30 INJECTION SUBCUTANEOUS at 15:36

## 2018-05-29 RX ADMIN — INSULIN ASPART 2 UNITS: 100 INJECTION, SOLUTION INTRAVENOUS; SUBCUTANEOUS at 08:25

## 2018-05-29 RX ADMIN — INSULIN DETEMIR 30 UNITS: 100 INJECTION, SOLUTION SUBCUTANEOUS at 21:50

## 2018-05-29 RX ADMIN — NICOTINE 1 PATCH: 21 PATCH TRANSDERMAL at 00:03

## 2018-05-29 RX ADMIN — INSULIN ASPART 2 UNITS: 100 INJECTION, SOLUTION INTRAVENOUS; SUBCUTANEOUS at 17:04

## 2018-05-30 LAB
ANION GAP SERPL CALCULATED.3IONS-SCNC: 11.5 MMOL/L
BUN BLD-MCNC: 16 MG/DL (ref 6–20)
BUN/CREAT SERPL: 10.5 (ref 7–25)
CALCIUM SPEC-SCNC: 9.5 MG/DL (ref 8.6–10.5)
CHLORIDE SERPL-SCNC: 96 MMOL/L (ref 98–107)
CO2 SERPL-SCNC: 31.5 MMOL/L (ref 22–29)
CORTIS SERPL-MCNC: 13.77 MCG/DL
CREAT BLD-MCNC: 1.52 MG/DL (ref 0.76–1.27)
GFR SERPL CREATININE-BSD FRML MDRD: 51 ML/MIN/1.73
GLUCOSE BLD-MCNC: 145 MG/DL (ref 65–99)
GLUCOSE BLDC GLUCOMTR-MCNC: 118 MG/DL (ref 70–130)
GLUCOSE BLDC GLUCOMTR-MCNC: 145 MG/DL (ref 70–130)
GLUCOSE BLDC GLUCOMTR-MCNC: 159 MG/DL (ref 70–130)
GLUCOSE BLDC GLUCOMTR-MCNC: 165 MG/DL (ref 70–130)
POTASSIUM BLD-SCNC: 3.7 MMOL/L (ref 3.5–5.2)
SODIUM BLD-SCNC: 139 MMOL/L (ref 136–145)

## 2018-05-30 PROCEDURE — 25010000002 ENOXAPARIN PER 10 MG: Performed by: INTERNAL MEDICINE

## 2018-05-30 PROCEDURE — 80048 BASIC METABOLIC PNL TOTAL CA: CPT | Performed by: INTERNAL MEDICINE

## 2018-05-30 PROCEDURE — 82962 GLUCOSE BLOOD TEST: CPT

## 2018-05-30 PROCEDURE — 99232 SBSQ HOSP IP/OBS MODERATE 35: CPT | Performed by: INTERNAL MEDICINE

## 2018-05-30 PROCEDURE — 82533 TOTAL CORTISOL: CPT | Performed by: INTERNAL MEDICINE

## 2018-05-30 PROCEDURE — 63710000001 INSULIN DETEMER PER 5 UNITS: Performed by: INTERNAL MEDICINE

## 2018-05-30 PROCEDURE — 63710000001 INSULIN ASPART PER 5 UNITS: Performed by: INTERNAL MEDICINE

## 2018-05-30 RX ADMIN — NICOTINE 1 PATCH: 21 PATCH, EXTENDED RELEASE TRANSDERMAL at 20:57

## 2018-05-30 RX ADMIN — DULOXETINE HYDROCHLORIDE 60 MG: 60 CAPSULE, DELAYED RELEASE ORAL at 08:15

## 2018-05-30 RX ADMIN — INSULIN ASPART 2 UNITS: 100 INJECTION, SOLUTION INTRAVENOUS; SUBCUTANEOUS at 08:06

## 2018-05-30 RX ADMIN — LEVOTHYROXINE SODIUM ANHYDROUS 175 MCG: 100 INJECTION, POWDER, LYOPHILIZED, FOR SOLUTION INTRAVENOUS at 11:23

## 2018-05-30 RX ADMIN — CETIRIZINE HYDROCHLORIDE 10 MG: 10 TABLET, FILM COATED ORAL at 08:15

## 2018-05-30 RX ADMIN — ENOXAPARIN SODIUM 40 MG: 40 INJECTION SUBCUTANEOUS at 12:59

## 2018-05-30 RX ADMIN — LIOTHYRONINE SODIUM 10 MCG: 5 TABLET ORAL at 17:42

## 2018-05-30 RX ADMIN — AMLODIPINE BESYLATE 10 MG: 10 TABLET ORAL at 08:15

## 2018-05-30 RX ADMIN — INSULIN ASPART 2 UNITS: 100 INJECTION, SOLUTION INTRAVENOUS; SUBCUTANEOUS at 11:22

## 2018-05-30 RX ADMIN — INSULIN DETEMIR 35 UNITS: 100 INJECTION, SOLUTION SUBCUTANEOUS at 20:57

## 2018-05-30 RX ADMIN — LIOTHYRONINE SODIUM 10 MCG: 5 TABLET ORAL at 08:15

## 2018-05-30 RX ADMIN — FAMOTIDINE 40 MG: 40 TABLET, FILM COATED ORAL at 08:15

## 2018-05-31 LAB
ANION GAP SERPL CALCULATED.3IONS-SCNC: 14.1 MMOL/L
BUN BLD-MCNC: 20 MG/DL (ref 6–20)
BUN/CREAT SERPL: 12 (ref 7–25)
CALCIUM SPEC-SCNC: 9.8 MG/DL (ref 8.6–10.5)
CHLORIDE SERPL-SCNC: 95 MMOL/L (ref 98–107)
CO2 SERPL-SCNC: 29.9 MMOL/L (ref 22–29)
CREAT BLD-MCNC: 1.67 MG/DL (ref 0.76–1.27)
GFR SERPL CREATININE-BSD FRML MDRD: 45 ML/MIN/1.73
GLUCOSE BLD-MCNC: 136 MG/DL (ref 65–99)
GLUCOSE BLDC GLUCOMTR-MCNC: 123 MG/DL (ref 70–130)
GLUCOSE BLDC GLUCOMTR-MCNC: 137 MG/DL (ref 70–130)
GLUCOSE BLDC GLUCOMTR-MCNC: 146 MG/DL (ref 70–130)
GLUCOSE BLDC GLUCOMTR-MCNC: 166 MG/DL (ref 70–130)
GLUCOSE BLDC GLUCOMTR-MCNC: 176 MG/DL (ref 70–130)
POTASSIUM BLD-SCNC: 3.8 MMOL/L (ref 3.5–5.2)
SODIUM BLD-SCNC: 139 MMOL/L (ref 136–145)
T3FREE SERPL-MCNC: 1.58 PG/ML (ref 2–4.4)
T4 FREE SERPL-MCNC: 0.32 NG/DL (ref 0.93–1.7)

## 2018-05-31 PROCEDURE — 82962 GLUCOSE BLOOD TEST: CPT

## 2018-05-31 PROCEDURE — 99232 SBSQ HOSP IP/OBS MODERATE 35: CPT | Performed by: INTERNAL MEDICINE

## 2018-05-31 PROCEDURE — 63710000001 INSULIN ASPART PER 5 UNITS: Performed by: INTERNAL MEDICINE

## 2018-05-31 PROCEDURE — 80048 BASIC METABOLIC PNL TOTAL CA: CPT | Performed by: INTERNAL MEDICINE

## 2018-05-31 PROCEDURE — 84439 ASSAY OF FREE THYROXINE: CPT | Performed by: INTERNAL MEDICINE

## 2018-05-31 PROCEDURE — 84481 FREE ASSAY (FT-3): CPT | Performed by: INTERNAL MEDICINE

## 2018-05-31 PROCEDURE — 25010000002 ENOXAPARIN PER 10 MG: Performed by: INTERNAL MEDICINE

## 2018-05-31 RX ORDER — LISINOPRIL 10 MG/1
10 TABLET ORAL
Status: DISCONTINUED | OUTPATIENT
Start: 2018-05-31 | End: 2018-06-04 | Stop reason: HOSPADM

## 2018-05-31 RX ORDER — LIOTHYRONINE SODIUM 5 UG/1
15 TABLET ORAL 2 TIMES DAILY WITH MEALS
Status: DISCONTINUED | OUTPATIENT
Start: 2018-05-31 | End: 2018-06-02

## 2018-05-31 RX ORDER — LEVOTHYROXINE SODIUM ANHYDROUS 100 UG/5ML
200 INJECTION, POWDER, LYOPHILIZED, FOR SOLUTION INTRAVENOUS
Status: DISCONTINUED | OUTPATIENT
Start: 2018-05-31 | End: 2018-06-02

## 2018-05-31 RX ADMIN — LEVOTHYROXINE SODIUM ANHYDROUS 200 MCG: 100 INJECTION, POWDER, LYOPHILIZED, FOR SOLUTION INTRAVENOUS at 11:16

## 2018-05-31 RX ADMIN — DULOXETINE HYDROCHLORIDE 60 MG: 60 CAPSULE, DELAYED RELEASE ORAL at 08:19

## 2018-05-31 RX ADMIN — INSULIN DETEMIR 35 UNITS: 100 INJECTION, SOLUTION SUBCUTANEOUS at 21:28

## 2018-05-31 RX ADMIN — INSULIN ASPART 2 UNITS: 100 INJECTION, SOLUTION INTRAVENOUS; SUBCUTANEOUS at 11:32

## 2018-05-31 RX ADMIN — FAMOTIDINE 40 MG: 40 TABLET, FILM COATED ORAL at 08:19

## 2018-05-31 RX ADMIN — ENOXAPARIN SODIUM 40 MG: 40 INJECTION SUBCUTANEOUS at 12:51

## 2018-05-31 RX ADMIN — AMLODIPINE BESYLATE 10 MG: 10 TABLET ORAL at 08:19

## 2018-05-31 RX ADMIN — LIOTHYRONINE SODIUM 15 MCG: 5 TABLET ORAL at 17:15

## 2018-05-31 RX ADMIN — INSULIN ASPART 2 UNITS: 100 INJECTION, SOLUTION INTRAVENOUS; SUBCUTANEOUS at 21:32

## 2018-05-31 RX ADMIN — NICOTINE 1 PATCH: 21 PATCH, EXTENDED RELEASE TRANSDERMAL at 21:31

## 2018-05-31 RX ADMIN — CETIRIZINE HYDROCHLORIDE 10 MG: 10 TABLET, FILM COATED ORAL at 08:19

## 2018-05-31 RX ADMIN — LISINOPRIL 10 MG: 10 TABLET ORAL at 12:51

## 2018-05-31 RX ADMIN — LIOTHYRONINE SODIUM 10 MCG: 5 TABLET ORAL at 08:19

## 2018-06-01 ENCOUNTER — APPOINTMENT (OUTPATIENT)
Dept: CARDIOLOGY | Facility: HOSPITAL | Age: 43
End: 2018-06-01
Attending: INTERNAL MEDICINE

## 2018-06-01 LAB
ANION GAP SERPL CALCULATED.3IONS-SCNC: 12.2 MMOL/L
BH CV ECHO MEAS - BSA(HAYCOCK): 2.6 M^2
BH CV ECHO MEAS - BSA: 2.5 M^2
BH CV ECHO MEAS - BZI_BMI: 36.5 KILOGRAMS/M^2
BH CV ECHO MEAS - BZI_METRIC_HEIGHT: 188 CM
BH CV ECHO MEAS - BZI_METRIC_WEIGHT: 128.8 KG
BH CV ECHO MEAS - CONTRAST EF (2CH): 44 ML/M^2
BH CV ECHO MEAS - CONTRAST EF 4CH: 40.5 ML/M^2
BH CV ECHO MEAS - EDV(MOD-SP2): 175 ML
BH CV ECHO MEAS - EDV(MOD-SP4): 153 ML
BH CV ECHO MEAS - EF(MOD-BP): 43 %
BH CV ECHO MEAS - EF(MOD-SP2): 44 %
BH CV ECHO MEAS - EF(MOD-SP4): 40.5 %
BH CV ECHO MEAS - ESV(MOD-SP2): 98 ML
BH CV ECHO MEAS - ESV(MOD-SP4): 91 ML
BH CV ECHO MEAS - LV DIASTOLIC VOL/BSA (35-75): 60.7 ML/M^2
BH CV ECHO MEAS - LV SYSTOLIC VOL/BSA (12-30): 36.1 ML/M^2
BH CV ECHO MEAS - LVLD AP2: 9.6 CM
BH CV ECHO MEAS - LVLD AP4: 9 CM
BH CV ECHO MEAS - LVLS AP2: 8.1 CM
BH CV ECHO MEAS - LVLS AP4: 7.9 CM
BH CV ECHO MEAS - SI(MOD-SP2): 30.5 ML/M^2
BH CV ECHO MEAS - SI(MOD-SP4): 24.6 ML/M^2
BH CV ECHO MEAS - SV(MOD-SP2): 77 ML
BH CV ECHO MEAS - SV(MOD-SP4): 62 ML
BH CV VAS BP RIGHT ARM: NORMAL MMHG
BUN BLD-MCNC: 21 MG/DL (ref 6–20)
BUN/CREAT SERPL: 12.5 (ref 7–25)
CALCIUM SPEC-SCNC: 9.4 MG/DL (ref 8.6–10.5)
CHLORIDE SERPL-SCNC: 98 MMOL/L (ref 98–107)
CO2 SERPL-SCNC: 29.8 MMOL/L (ref 22–29)
CREAT BLD-MCNC: 1.68 MG/DL (ref 0.76–1.27)
GFR SERPL CREATININE-BSD FRML MDRD: 45 ML/MIN/1.73
GLUCOSE BLD-MCNC: 136 MG/DL (ref 65–99)
GLUCOSE BLDC GLUCOMTR-MCNC: 118 MG/DL (ref 70–130)
GLUCOSE BLDC GLUCOMTR-MCNC: 127 MG/DL (ref 70–130)
GLUCOSE BLDC GLUCOMTR-MCNC: 137 MG/DL (ref 70–130)
GLUCOSE BLDC GLUCOMTR-MCNC: 179 MG/DL (ref 70–130)
LV EF 2D ECHO EST: 40 %
MAXIMAL PREDICTED HEART RATE: 178 BPM
POTASSIUM BLD-SCNC: 4 MMOL/L (ref 3.5–5.2)
SODIUM BLD-SCNC: 140 MMOL/L (ref 136–145)
STRESS TARGET HR: 151 BPM
T3FREE SERPL-MCNC: 1.91 PG/ML (ref 2–4.4)
T4 FREE SERPL-MCNC: 0.78 NG/DL (ref 0.93–1.7)

## 2018-06-01 PROCEDURE — 93308 TTE F-UP OR LMTD: CPT

## 2018-06-01 PROCEDURE — 94799 UNLISTED PULMONARY SVC/PX: CPT

## 2018-06-01 PROCEDURE — 93325 DOPPLER ECHO COLOR FLOW MAPG: CPT

## 2018-06-01 PROCEDURE — 80048 BASIC METABOLIC PNL TOTAL CA: CPT | Performed by: INTERNAL MEDICINE

## 2018-06-01 PROCEDURE — 99232 SBSQ HOSP IP/OBS MODERATE 35: CPT | Performed by: INTERNAL MEDICINE

## 2018-06-01 PROCEDURE — 82962 GLUCOSE BLOOD TEST: CPT

## 2018-06-01 PROCEDURE — 63710000001 INSULIN ASPART PER 5 UNITS: Performed by: INTERNAL MEDICINE

## 2018-06-01 PROCEDURE — 93321 DOPPLER ECHO F-UP/LMTD STD: CPT

## 2018-06-01 PROCEDURE — 84481 FREE ASSAY (FT-3): CPT | Performed by: INTERNAL MEDICINE

## 2018-06-01 PROCEDURE — 25010000002 PERFLUTREN (DEFINITY) 8.476 MG IN SODIUM CHLORIDE 0.9 % 10 ML INJECTION: Performed by: INTERNAL MEDICINE

## 2018-06-01 PROCEDURE — 93308 TTE F-UP OR LMTD: CPT | Performed by: INTERNAL MEDICINE

## 2018-06-01 PROCEDURE — 93325 DOPPLER ECHO COLOR FLOW MAPG: CPT | Performed by: INTERNAL MEDICINE

## 2018-06-01 PROCEDURE — 25010000002 ENOXAPARIN PER 10 MG: Performed by: INTERNAL MEDICINE

## 2018-06-01 PROCEDURE — 84439 ASSAY OF FREE THYROXINE: CPT | Performed by: INTERNAL MEDICINE

## 2018-06-01 PROCEDURE — 93321 DOPPLER ECHO F-UP/LMTD STD: CPT | Performed by: INTERNAL MEDICINE

## 2018-06-01 RX ADMIN — INSULIN ASPART 2 UNITS: 100 INJECTION, SOLUTION INTRAVENOUS; SUBCUTANEOUS at 21:01

## 2018-06-01 RX ADMIN — DULOXETINE HYDROCHLORIDE 60 MG: 60 CAPSULE, DELAYED RELEASE ORAL at 09:34

## 2018-06-01 RX ADMIN — ENOXAPARIN SODIUM 40 MG: 40 INJECTION SUBCUTANEOUS at 14:15

## 2018-06-01 RX ADMIN — LEVOTHYROXINE SODIUM ANHYDROUS 200 MCG: 100 INJECTION, POWDER, LYOPHILIZED, FOR SOLUTION INTRAVENOUS at 11:55

## 2018-06-01 RX ADMIN — CETIRIZINE HYDROCHLORIDE 10 MG: 10 TABLET, FILM COATED ORAL at 09:34

## 2018-06-01 RX ADMIN — PERFLUTREN 3 ML: 6.52 INJECTION, SUSPENSION INTRAVENOUS at 11:00

## 2018-06-01 RX ADMIN — LIOTHYRONINE SODIUM 15 MCG: 5 TABLET ORAL at 17:15

## 2018-06-01 RX ADMIN — FAMOTIDINE 40 MG: 40 TABLET, FILM COATED ORAL at 09:34

## 2018-06-01 RX ADMIN — INSULIN DETEMIR 35 UNITS: 100 INJECTION, SOLUTION SUBCUTANEOUS at 21:01

## 2018-06-01 RX ADMIN — NICOTINE 1 PATCH: 21 PATCH, EXTENDED RELEASE TRANSDERMAL at 20:28

## 2018-06-01 RX ADMIN — LISINOPRIL 10 MG: 10 TABLET ORAL at 09:34

## 2018-06-01 RX ADMIN — LIOTHYRONINE SODIUM 15 MCG: 5 TABLET ORAL at 09:34

## 2018-06-01 RX ADMIN — AMLODIPINE BESYLATE 10 MG: 10 TABLET ORAL at 09:34

## 2018-06-01 NOTE — SIGNIFICANT NOTE
06/01/18 0828   Rehab Time/Intention   Evaluation Not Performed other (see comments)  (Noted new PT orders. Talked with patient and he denies any complaints at this time. Per RN request, patient ambulated with PT. Walked completely independent. Not appropriate for PT services acutely. CCP notified. Will sign off.)   Rehab Treatment   Discipline physical therapist

## 2018-06-01 NOTE — PROGRESS NOTES
Name: Joshua Sanchez ADMIT: 2018   : 1975  PCP: Otto Antony, MIKEL    MRN: [id number] LOS: 3 days   AGE/SEX: 42 y.o. male    ROOM: [facility]   Subjective   Pericardial effusion and severe hypothyroidism secondary to noncompliance with medications  He feels sleepy today but the has no complaints    Brief hospital course since admission:  Morbid obesity  Hypothyroidism  Suspected sleep apnea  Diabetes mellitus  Pericardial effusion  Cardiomyopathy with ejection fraction 30%    I have reviewed past medical history, social hisotory, family history, allergies.  No changes from admission note.      Review of Systems   Constitutional: Positive for fatigue.   Respiratory: Negative for shortness of breath.    Cardiovascular: Negative for chest pain and palpitations.          Objective   Vital Signs  Temp:  [98.4 °F (36.9 °C)-98.6 °F (37 °C)] 98.5 °F (36.9 °C)  Heart Rate:  [80-89] 89  Resp:  [16-18] 16  BP: (120-143)/() 125/97  SpO2:  [90 %-94 %] 92 %  on  Flow (L/min):  [1.5-2] 2;   Device (Oxygen Therapy): room air  Body mass index is 36.5 kg/m².    Intake/Output Summary (Last 24 hours) at 18 1153  Last data filed at 18 0900   Gross per 24 hour   Intake              360 ml   Output             1175 ml   Net             -815 ml       Physical Exam   Constitutional: He is oriented to person, place, and time. He appears well-developed and well-nourished.   BMI 38   HENT:   Head: Atraumatic.   Oral airway Mallampati class IV   Eyes: Pupils are equal, round, and reactive to light. No scleral icterus.   Cardiovascular: Normal rate and regular rhythm.    Pulmonary/Chest: No accessory muscle usage. No respiratory distress. He has no decreased breath sounds. He has no wheezes.   Abdominal: Soft. Normal appearance and bowel sounds are normal. He exhibits no ascites. There is no hepatosplenomegaly.   Neurological: He is alert and oriented to person, place, and time.   Skin: No laceration and no  petechiae noted. No cyanosis. Nails show no clubbing.   Psychiatric: He has a normal mood and affect. His behavior is normal.   Vitals reviewed.      Results Review:      Results from last 7 days  Lab Units 05/29/18  0301 05/28/18  1745   WBC 10*3/mm3 12.97* 13.01*   HEMOGLOBIN g/dL 14.6 15.6   HEMATOCRIT % 42.4 44.8   PLATELETS 10*3/mm3 242 284       Results from last 7 days  Lab Units 06/01/18  0451 05/31/18  0430 05/30/18  0802   SODIUM mmol/L 140 139 139   POTASSIUM mmol/L 4.0 3.8 3.7   CHLORIDE mmol/L 98 95* 96*   CO2 mmol/L 29.8* 29.9* 31.5*   BUN mg/dL 21* 20 16   CREATININE mg/dL 1.68* 1.67* 1.52*   GLUCOSE mg/dL 136* 136* 145*   CALCIUM mg/dL 9.4 9.8 9.5       Results from last 7 days  Lab Units 05/29/18  0301   INR  0.96       Results from last 7 days  Lab Units 05/31/18  0430 05/29/18  0301   TSH mIU/mL  --  >100.010*   T3 FREE pg/mL 1.58*  --    FREE T4 ng/dL 0.32* <0.10*       Hemoglobin A1C:  Lab Results   Component Value Date    HGBA1C 7.70 (H) 05/29/2018     Glucose Range:  Glucose   Date/Time Value Ref Range Status   06/01/2018 1149 118 70 - 130 mg/dL Final   06/01/2018 0639 127 70 - 130 mg/dL Final   05/31/2018 2122 166 (H) 70 - 130 mg/dL Final   05/31/2018 1554 123 70 - 130 mg/dL Final   05/31/2018 1115 176 (H) 70 - 130 mg/dL Final   05/31/2018 0553 137 (H) 70 - 130 mg/dL Final         amLODIPine 10 mg Oral Daily   cetirizine 10 mg Oral Daily   DULoxetine 60 mg Oral Daily   enoxaparin 40 mg Subcutaneous Q24H   famotidine 40 mg Oral Daily   insulin aspart 0-9 Units Subcutaneous 4x Daily With Meals & Nightly   insulin detemir 35 Units Subcutaneous Nightly   levothyroxine sodium 200 mcg Intravenous Daily   liothyronine 15 mcg Oral BID With Meals   lisinopril 10 mg Oral Q24H   nicotine 1 patch Transdermal Q24H      Diet Regular; Consistent Carbohydrate, Cardiac  Assessment/Plan       Assessment/Plan      Active Hospital Problems (** Indicates Principal Problem)    Diagnosis Date Noted   •  **Pericardial effusion [I31.3] 05/28/2018   • Acquired hypothyroidism [E03.9] 05/29/2018     Priority: High   • Cardiomyopathy [I42.9] 05/29/2018   • Hemoptysis [R04.2] 05/29/2018   • Type 2 diabetes mellitus [E11.9] 05/29/2018   • LORENZA (acute kidney injury) [N17.9] 05/29/2018   • HYACINTH (obstructive sleep apnea) [G47.33] 05/29/2018   • Alopecia totalis [L63.0] 05/29/2018   • Rheumatoid arthritis [M06.9] 05/29/2018   • Chest pain [R07.9] 05/29/2018      Resolved Hospital Problems    Diagnosis Date Noted Date Resolved   No resolved problems to display.     Patient's A1c 7.7   Continue levothyroxine per endocrinology  Hemoptysis resolved  Needs sleep study as an outpatient which will be arranged at the time of discharge and follow-up [facility].  Thyroid levels improving, monitor daily    Patient is ambulating, will go home when endo changes his IV levothyroxine to PO    Ralph Hall MD  [facility]  06/01/18

## 2018-06-01 NOTE — PROGRESS NOTES
LOS: 3 days   Patient Care Team:  MIKEL Shi as PCP - General (Family Medicine)  Matthew Hannon MD as Consulting Physician (Hematology and Oncology)  MIKEL Shi as Referring Physician (Family Medicine)    Subjective   Patient reports he is feeling much better he hasn't coughed up any blood since I saw him yesterday morning that's over 36 hours now.  He is up walking he says he walked the physical therapist today.  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  I/O last 3 completed shifts:  In: -   Out: 1525 [Urine:1525]  No intake/output data recorded.        Physical Exam:  General Appearance: well Developed white male resting comfortably in bed in no apparent distress  Eyes: Conjunctiva are clear and anicteric  ENT: Membranes are moist no erythema or exudates Mallampati 2 almost 3 airway  Neck: Trachea midline no visible jugular venous distention no palpable adenopathy or thyromegaly  Lungs: Clear nonlabored symmetric expansion no wheezes rales he did have a rhonchi in the right base that coughing cleared.  Cardiac: Regular rate and rhythm no murmur or gallop  Abdomen: Soft no palpable organomegaly or masses  : Not examined  Musc/Skel: Grossly normal  Skin: Alopecia  Neuro: Alert cooperative grossly intact  Extremities/P Vascular: No clubbing or cyanosis trace pitting pretibial edema bilaterally.  Palpable radial and dorsalis pedis  pulses  MSE: Still pretty flat affect       Labs:    Results from last 7 days  Lab Units 06/01/18  0451 05/31/18  0430 05/30/18  0802 05/29/18  1318 05/29/18  0301 05/28/18  1745   GLUCOSE mg/dL 136* 136* 145* 135* 163* 158*   SODIUM mmol/L 140 139 139 143 140 144   POTASSIUM mmol/L 4.0 3.8 3.7 3.7 3.6 4.1   CO2 mmol/L 29.8* 29.9* 31.5* 33.7* 30.7* 31.8*   CHLORIDE mmol/L 98 95* 96* 100 99 99   ANION GAP mmol/L 12.2 14.1 11.5 9.3 10.3 13.2   CREATININE mg/dL 1.68* 1.67* 1.52* 1.64* 1.46* 1.48*   BUN mg/dL 21* 20 16 15 13 14   BUN / CREAT RATIO  12.5 12.0 10.5 9.1 8.9 9.5   CALCIUM mg/dL 9.4 9.8 9.5 9.8 9.3 10.0   EGFR IF NONAFRICN AM mL/min/1.73 45* 45* 51* 46* 53* 52*   ALK PHOS U/L  --   --   --   --   --  60   TOTAL PROTEIN g/dL  --   --   --   --   --  7.0   ALT (SGPT) U/L  --   --   --   --   --  67*   AST (SGOT) U/L  --   --   --   --   --  68*   BILIRUBIN mg/dL  --   --   --   --   --  0.8   ALBUMIN g/dL  --   --   --   --   --  5.00   GLOBULIN gm/dL  --   --   --   --   --  2.0   A/G RATIO g/dL  --   --   --   --   --  2.5     Estimated Creatinine Clearance: 81.8 mL/min (A) (by C-G formula based on SCr of 1.68 mg/dL (H)).        Results from last 7 days  Lab Units 05/29/18  0301 05/28/18  1745   WBC 10*3/mm3 12.97* 13.01*   RBC 10*6/mm3 4.42* 4.72   HEMOGLOBIN g/dL 14.6 15.6   HEMATOCRIT % 42.4 44.8   MCV fL 95.9* 94.9*   MCH pg 33.0* 33.1*   MCHC g/dL 34.4 34.8   RDW % 13.6 13.3   RDW-SD fl 48.5 46.5   MPV fL 9.3 9.8   PLATELETS 10*3/mm3 242 284   NEUTROPHIL % % 57.1 63.0   LYMPHOCYTE % % 32.5 27.4   MONOCYTES % % 5.0 4.7   EOSINOPHIL % % 4.5* 3.8   BASOPHIL % % 0.5 0.6   IMM GRAN % % 0.4 0.5   NEUTROS ABS 10*3/mm3 7.40 8.18   LYMPHS ABS 10*3/mm3 4.21 3.57   MONOS ABS 10*3/mm3 0.65 0.61   EOS ABS 10*3/mm3 0.59* 0.50*   BASOS ABS 10*3/mm3 0.07 0.08   IMMATURE GRANS (ABS) 10*3/mm3 0.05* 0.07*   NRBC /100 WBC 0.0 0.0           Results from last 7 days  Lab Units 05/29/18  0301 05/28/18  1745   TROPONIN T ng/mL  0.018 0.024       Results from last 7 days  Lab Units 05/28/18  1745   PROBNP pg/mL 81.0       Results from last 7 days  Lab Units 05/31/18  0430 05/29/18  0301   TSH mIU/mL  --  >100.010*   T3 FREE pg/mL 1.58*  --    FREE T4 ng/dL 0.32* <0.10*       Results from last 7 days  Lab Units 05/29/18  0301   PROCALCITONIN ng/mL 0.10       Results from last 7 days  Lab Units 05/29/18  0301   INR  0.96     Microbiology Results (last 10 days)     Procedure Component Value - Date/Time    Respiratory Panel, PCR - Swab, Nasopharynx [[id number]]  (Normal) Collected:  05/29/18 0658    Lab Status:  Final result Specimen:  Swab from Nasopharynx Updated:  05/29/18 1334     ADENOVIRUS, PCR Not Detected     Coronavirus 229E Not Detected     Coronavirus HKU1 Not Detected     Coronavirus NL63 Not Detected     Coronavirus OC43 Not Detected     Human Metapneumovirus Not Detected     Human Rhinovirus/Enterovirus Not Detected     Influenza B PCR Not Detected     Parainfluenza Virus 1 Not Detected     Parainfluenza Virus 2 Not Detected     Parainfluenza Virus 3 Not Detected     Parainfluenza Virus 4 Not Detected     Bordetella pertussis pcr Not Detected     Influenza A H1 2009 PCR Not Detected     Chlamydophila pneumoniae PCR Not Detected     Mycoplasma pneumo by PCR Not Detected     Influenza A PCR Not Detected     Influenza A H3 Not Detected     Influenza A H1 Not Detected     RSV, PCR Not Detected                amLODIPine 10 mg Oral Daily   cetirizine 10 mg Oral Daily   DULoxetine 60 mg Oral Daily   enoxaparin 40 mg Subcutaneous Q24H   famotidine 40 mg Oral Daily   insulin aspart 0-9 Units Subcutaneous 4x Daily With Meals & Nightly   insulin detemir 35 Units Subcutaneous Nightly   levothyroxine sodium 200 mcg Intravenous Daily   liothyronine 15 mcg Oral BID With Meals   lisinopril 10 mg Oral Q24H   nicotine 1 patch Transdermal Q24H          Diagnostics:  Xr Chest 2 View    Result Date: 5/29/2018  PA AND LATERAL CHEST.  DATE: 05/28/2018 at  1825 hours.  HISTORY: Hemoptysis which began at 3:30 PM tonight.  COMPARISON: None.  FINDINGS: Moderate generalized enlargement of the cardiomediastinal silhouette. No acute airspace disease is seen. Pulmonary vascular distribution is within normal limits. No pleural effusion, pneumothorax or acute osseous abnormality.      1. Moderate generalized enlargement of the cardiomediastinal silhouette. Findings could reflect changes of cardiac decompensation. Underlying pericardial effusion not excluded. 2. No acute airspace disease or evidence of pulmonary edema.  This report was finalized on 5/29/2018 8:55 AM by Dr. Giovanna Downey MD.      Ct Angiogram Chest With Contrast    Result Date: 5/29/2018  CTA CHEST PE PROTOCOL WITH IV CONTRAST.  DATE: 05/28/2018  HISTORY: Hemoptysis, chest pain and shortness breath which began today.  COMPARISON: PA and lateral chest radiograph 05/28/2018.  No prior CT chest at this institution for comparison.. No prior nuclear medicine myocardial perfusion scan or CT scan in the past 12 months.  TECHNIQUE: 1.3 mm axial images through the chest after intravenous contrast injection. 3-D coronal MIP reformatted images were obtained.   Radiation dose reduction techniques were utilized, including automated exposure control and exposure modulation based on body size.  FINDINGS: No pulmonary embolism is seen. Moderate concentric pericardial effusion, measuring nearly 1.5 cm thickness. No thoracic aortic aneurysm or dissection. No pleural effusion. No acute airspace disease.  Liver appears diffusely steatotic. Remainder of included upper abdominal organs appear within normal limits. Healed or healing right 8th rib fracture and 7th rib fracture anterolaterally. No pneumothorax.      1. Moderate concentric pericardial effusion measuring up to 1.5 cm thickness. 2. Healed or healing right 7th and 8th rib fractures. 3. No pulmonary embolism. 4. Preliminary report provided by Dr. Quesada on 05/28/2018 at  2052.  This report was finalized on 5/29/2018 7:54 AM by Dr. Giovanna Downey MD.      Results for orders placed during the hospital encounter of 05/28/18   Adult Transthoracic Echo Complete W/ Cont if Necessary Per Protocol    Narrative · Calculated EF = 30%. Estimated EF appears to be in the range of 26 -   30%. Global Longitudinal LV strain = -8%. Strain data was reviewed and   speckle tracking was considered accurate. The left ventricular cavity is   mildly dilated. There is moderately severe LV systolic dysfunction. Wall   motion is difficult to assess due to poor visualization, but the   anteroapex appears more hypokinetic than other segments.  · There is a moderate (1-2cm) circumferential pericardial effusion. There   is right atrial free wall collapse present. There does not appear to   evidence of tamponade by doppler, but the study is suboptimal.              Active Hospital Problems (** Indicates Principal Problem)    Diagnosis Date Noted   • **Pericardial effusion [I31.3] 05/28/2018   • Acquired hypothyroidism [E03.9] 05/29/2018   • Cardiomyopathy [I42.9] 05/29/2018   • Hemoptysis [R04.2] 05/29/2018   • Type 2 diabetes mellitus [E11.9] 05/29/2018   • LORENZA (acute kidney injury) [N17.9] 05/29/2018   • HYACINTH (obstructive sleep apnea) [G47.33] 05/29/2018   • Alopecia totalis [L63.0] 05/29/2018   • Rheumatoid arthritis [M06.9] 05/29/2018   • Chest pain [R07.9] 05/29/2018      Resolved Hospital Problems    Diagnosis Date Noted Date Resolved   No resolved problems to display.         Assessment/Plan     1. Hemoptysis and not sure the etiology of his hemoptysis, he has a mildly increased white count there is no definite infiltrates or pulmonary emboli seen on CT scan.  No masses, he is a smoker he is pretty young for lung cancer.  I wonder if he just didn't have a forceful cough that caused some hemoptysis or increased pulmonary pressures with CHF.   this has completley stopped last 36 hours as he has gotten better.   If recurs needs bronch  And instructed him to call if recurs otherwise can follow up when follow up sleep study  2. Chest pain/pressure  3. Pericardial effusion per cardiology  4. Cardiomyopathy question related to hypothyroid or other evaluation per cardiologyPlan cardiac catheterization at some point.  5. Diabetes mellitus type 2 with medical noncompliance  6. Hypothyroidism medical noncompliance  7. Hypertension  8. Hyperlipidemia  9. Rheumatoid arthritis  10. Obesity  11. Obstructive sleep apnea Sleep study in [place]. Orders enteredTobacco abuse he needs to stop smoking but was not committing to attempting to stop.    Plan for disposition:    Ryan Sagastume MD  06/01/18  7:54 AM    Time:

## 2018-06-01 NOTE — PROGRESS NOTES
42 y.o.   LOS: 3 days   Patient Care Team:  MIKEL Shi as PCP - General (Family Medicine)  Matthew Hannon MD as Consulting Physician (Hematology and Oncology)  MIKEL Shi as Referring Physician (Family Medicine)    Chief Complaint:  Severe hypothyroidism         Subjective   Pt reports feeling well. Still remains on iv thyroid medications.   Noted that Ft4 and Ft3 levels continues to improve.       Interval History:    Review of Systems:   Review of Systems   Constitutional: Positive for appetite change and fatigue.   Eyes: Negative for visual disturbance.   Respiratory: Negative for shortness of breath.    Cardiovascular: Positive for leg swelling. Negative for palpitations.   Gastrointestinal: Negative for nausea and vomiting.   Endocrine: Negative for polydipsia and polyuria.   Musculoskeletal: Positive for arthralgias.   Skin: Negative for pallor.   Neurological: Positive for weakness. Negative for numbness.   Psychiatric/Behavioral: Negative for agitation.     Objective     Vital Signs   Temp:  [98.4 °F (36.9 °C)-98.6 °F (37 °C)] 98.4 °F (36.9 °C)  Heart Rate:  [80-89] 81  Resp:  [16] 16  BP: (120-125)/(77-97) 124/77    Physical Exam:  Physical Exam   Constitutional: He is oriented to person, place, and time. He appears well-nourished.   obese   HENT:   Head: Normocephalic and atraumatic.   Wide neck   Eyes: Conjunctivae and EOM are normal.   Neck: Normal range of motion. Neck supple. Carotid bruit is not present. No thyromegaly present.   Acanthosis nigricans   Cardiovascular: Normal rate.    Pulmonary/Chest: Effort normal and breath sounds normal. No stridor. No respiratory distress.   Abdominal: Soft. Bowel sounds are normal. He exhibits no distension. There is no tenderness.   Central obesity   Musculoskeletal: He exhibits no edema or tenderness.   Neurological: He is alert and oriented to person, place, and time.   Skin: Skin is warm and dry.   Psychiatric: He has a  normal mood and affect. His behavior is normal.   Vitals reviewed.  Results Review:     I reviewed the patient's new clinical results and summarized them in subjective and in plan.      Glucose   Date/Time Value Ref Range Status   06/01/2018 0451 136 (H) 65 - 99 mg/dL Final   05/31/2018 0430 136 (H) 65 - 99 mg/dL Final   05/30/2018 0802 145 (H) 65 - 99 mg/dL Final     Lab Results (last 24 hours)     Procedure Component Value Units Date/Time    POC Glucose Once [[id number]]  (Abnormal) Collected:  06/01/18 1612    Specimen:  Blood Updated:  06/01/18 1613     Glucose 137 (H) mg/dL     Narrative:       Meter: [id number] : [id number] Smailagic Berina CNA    T4, Free [[id number]]  (Abnormal) Collected:  06/01/18 1316    Specimen:  Blood Updated:  06/01/18 1409     Free T4 0.78 (L) ng/dL     T3, Free [[id number]]  (Abnormal) Collected:  06/01/18 1316    Specimen:  Blood Updated:  06/01/18 1409     T3, Free 1.91 (L) pg/mL     POC Glucose Once [[id number]]  (Normal) Collected:  06/01/18 1149    Specimen:  Blood Updated:  06/01/18 1151     Glucose 118 mg/dL     Narrative:       Meter: [id number] : [id number] Smailagic Berina CNA    POC Glucose Once [[id number]]  (Normal) Collected:  06/01/18 0639    Specimen:  Blood Updated:  06/01/18 0640     Glucose 127 mg/dL     Narrative:       Meter: [id number] : [id number] Jos LEDEZMA    Basic Metabolic Panel [[id number]]  (Abnormal) Collected:  06/01/18 0451    Specimen:  Blood Updated:  06/01/18 0542     Glucose 136 (H) mg/dL      BUN 21 (H) mg/dL      Creatinine 1.68 (H) mg/dL      Sodium 140 mmol/L      Potassium 4.0 mmol/L      Chloride 98 mmol/L      CO2 29.8 (H) mmol/L      Calcium 9.4 mg/dL      eGFR Non African Amer 45 (L) mL/min/1.73      BUN/Creatinine Ratio 12.5     Anion Gap 12.2 mmol/L     Narrative:       GFR Normal >60  Chronic Kidney Disease <60  Kidney Failure <15    POC Glucose Once [[id number]]  (Abnormal) Collected:  05/31/18 2122    Specimen:  Blood Updated:   05/31/18 2124     Glucose 166 (H) mg/dL     Narrative:       Meter: [id number] : [id number] Jos LEDEZMA        Imaging Results (last 24 hours)     ** No results found for the last 24 hours. **          Medication Review: done      Current Facility-Administered Medications:   •  amLODIPine (NORVASC) tablet 10 mg, 10 mg, Oral, Daily, Ralph Hall MD, 10 mg at 06/01/18 0934  •  bisacodyl (DULCOLAX) suppository 10 mg, 10 mg, Rectal, Daily PRN, Ralph Hall MD  •  cetirizine (zyrTEC) tablet 10 mg, 10 mg, Oral, Daily, Ralph Hall MD, 10 mg at 06/01/18 0934  •  dextrose (D50W) solution 25 g, 25 g, Intravenous, Q15 Min PRN, Ralph Hall MD  •  dextrose (GLUTOSE) oral gel 15 g, 15 g, Oral, Q15 Min PRN, Ralph Hall MD  •  DULoxetine (CYMBALTA) DR capsule 60 mg, 60 mg, Oral, Daily, Ralph Hall MD, 60 mg at 06/01/18 0934  •  enoxaparin (LOVENOX) syringe 40 mg, 40 mg, Subcutaneous, Q24H, Ralph Hall MD, 40 mg at 06/01/18 1415  •  famotidine (PEPCID) tablet 40 mg, 40 mg, Oral, Daily, Ralph Hall MD, 40 mg at 06/01/18 0934  •  glucagon (human recombinant) (GLUCAGEN DIAGNOSTIC) injection 1 mg, 1 mg, Subcutaneous, PRN, Ralph Hall MD  •  insulin aspart (novoLOG) injection 0-9 Units, 0-9 Units, Subcutaneous, 4x Daily With Meals & Nightly, Ralph Hall MD, 2 Units at 05/31/18 2132  •  insulin detemir (LEVEMIR) injection 35 Units, 35 Units, Subcutaneous, Nightly, Seth Killian MD, 35 Units at 05/31/18 2128  •  levothyroxine sodium injection 200 mcg, 200 mcg, Intravenous, Daily, Seth Killian MD, 200 mcg at 06/01/18 1155  •  liothyronine (CYTOMEL) tablet 15 mcg, 15 mcg, Oral, BID With Meals, Seth Killian MD, 15 mcg at 06/01/18 0934  •  lisinopril (PRINIVIL,ZESTRIL) tablet 10 mg, 10 mg, Oral, Q24H, Agus Hanks MD, 10 mg at 06/01/18 0934  •  nicotine (NICODERM CQ) 21 MG/24HR patch 1 patch, 1 patch, Transdermal, Q24H, Liu Alfaro MD, 1  patch at 05/31/18 2131  •  sodium chloride 0.9 % flush 1-10 mL, 1-10 mL, Intravenous, PRN, Ralph Hall MD    Assessment/Plan     Active Hospital Problems (** Indicates Principal Problem)    Diagnosis Date Noted   • **Pericardial effusion [I31.3] 05/28/2018   • Acquired hypothyroidism [E03.9] 05/29/2018   • Cardiomyopathy [I42.9] 05/29/2018   • Hemoptysis [R04.2] 05/29/2018   • Type 2 diabetes mellitus [E11.9] 05/29/2018   • LORENZA (acute kidney injury) [N17.9] 05/29/2018   • HYACINTH (obstructive sleep apnea) [G47.33] 05/29/2018   • Alopecia totalis [L63.0] 05/29/2018   • Rheumatoid arthritis [M06.9] 05/29/2018   • Chest pain [R07.9] 05/29/2018      Resolved Hospital Problems    Diagnosis Date Noted Date Resolved   No resolved problems to display.     Severe hypothyroidism - resulting in pericardial effusion.   TSH levels are significantly elevated.  Continue IV levothyroxine 200 mcg daily.   Continue cytomel 15 mcg oral bid.   Will check thyroid levels tomorrow - for FT4 and FT3 levels.   Discussed with the patient that IV thyroid medications and Cytomel could sometimes increased the cardiovascular risk-myocardial infarction cardiac arrhythmias.  However counseled the patient that given the severity of his thyroid dysfunction he would benefit with the IV thyroid medication.    Would change iv thyroid medication to oral once the FT4 and FT3 levels normalize.  Would expect the levels to become normal over the week end.   TSH levels - would change in 4 - 6 weeks, will check this as out - pt.      Type 2 diabetes mellitus - uncontrolled  Continue levemir 35 units QPM.   Will cover with NovoLog sliding scale-moderate dose 3 times a day before meals and at bedtime.     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    Am cortisol levels - wnl.      Hypogonadotropic hypogonadism  Will defer this workup for outpatient.    DC instructions  "from endocrine -   Oral levothyroxine - 200 mcg oral daily.   Follow up with me in clinic in the next 4 - 6 weeks.       Discussed plan with Nurse.     Seth Killian MD.  06/01/18  3:57 PM      EMR Dragon / transcription disclaimer:    \"Dictated utilizing Dragon dictation\".   "

## 2018-06-01 NOTE — CONSULTS
"Diabetes Education  Assessment/Teaching    Patient Name:  Joshua Sanchez  YOB: 1975  MRN: [id number]  Admit Date:  5/29/2018      Assessment Date:  6/1/2018    Most Recent Value   General Information    Referral From:  A1c, Database [A1C 7.7%]   Height  188 cm (74.02\")   Height Method  Stated   Weight  -- [bed scale not working]   Pregnancy Assessment   Diabetes History   What type of diabetes do you have?  Type 2   Length of Diabetes Diagnosis  6 -12 months   Current DM knowledge  poor   Have you had diabetes education/teaching in the past?  no   Do you test your blood sugar at home?  no   Have you had low blood sugar? (<70mg/dl)  no   Have you had high blood sugar? (>140mg/dl)  no   How would you rate your diabetes control?  poor   Education Preferences   What areas of diabetes would you like to learn about?  avoiding high blood sugar, medications for diabetes, diet information, testing my blood sugar at home   Barriers to Learning  other (comment) [None noted currently.  In the past, pt had insurances issues.]   Nutrition Information   Assessment Topics   Healthy Eating - Assessment  Needs education   Being Active - Assessment  Needs education   Taking Medication - Assessment  Needs education   Problem Solving - Assessment  Needs education   Reducing Risk - Assessment  Needs education   Healthy Coping - Assessment  Needs education   Monitoring - Assessment  Needs education   DM Goals   Taking Medication - Goal  0-7 days from discharge [to take meds as prescribed]   Reducing Risk - Goal  30-90 days from discharge   Monitoring - Goal  0-7 days from discharge [to test Bg 3-4 times james nd report to MD]            Most Recent Value   DM Education Needs   Meter  Meter provided   Meter Type  One Touch   Frequency of Testing  4 times a day   Blood Glucose Target Range  fasting  and less than 180 after meals   Medication  Insulin, Actions, Side effects, Administration, Vial, Pen [insluin instruction " given,pt did well with injection]   Reducing Risks  A1C testing   Physical Activity Frequency  Discussed exercise importance   Healthy Coping  Appropriate   Discharge Plan  Home   Motivation  Moderate   Teaching Method  Discussion, Explanation, Demonstration, Handouts, Teach back   Patient Response  Verbalized understanding, Needs reinforcement, Demonstrates adequately            Other Comments:  Discussed diabetes management with pt. Insulin instruction given,pt did well but does seem very sleepy and tired.Meter given and instructed,instructed to test 4 times day and report these to his MD.Pt unsure which MD's to see because he lives in [place].        Electronically signed by:  Theresa Pierson RN  06/01/18 3:45 PM

## 2018-06-01 NOTE — PLAN OF CARE
Problem: Patient Care Overview  Goal: Plan of Care Review  Outcome: Ongoing (interventions implemented as appropriate)   06/01/18 2235   Coping/Psychosocial   Plan of Care Reviewed With patient   Plan of Care Review   Progress improving   OTHER   Outcome Summary Seen by diabetic educator, oxygen when sleeping, no complaint of pain.BG wnl.     Goal: Individualization and Mutuality  Outcome: Ongoing (interventions implemented as appropriate)      Problem: Cardiac Output Decreased (Adult)  Goal: Effective Tissue Perfusion  Outcome: Ongoing (interventions implemented as appropriate)      Problem: Pain, Acute (Adult)  Goal: Acceptable Pain Control/Comfort Level  Outcome: Ongoing (interventions implemented as appropriate)      Problem: Activity Intolerance (Adult)  Goal: Activity Tolerance  Outcome: Ongoing (interventions implemented as appropriate)    Goal: Effective Energy Conservation Techniques  Outcome: Ongoing (interventions implemented as appropriate)      Problem: Diabetes, Type 2 (Adult)  Goal: Signs and Symptoms of Listed Potential Problems Will be Absent, Minimized or Managed (Diabetes, Type 2)  Outcome: Ongoing (interventions implemented as appropriate)

## 2018-06-01 NOTE — PLAN OF CARE
Problem: Patient Care Overview  Goal: Plan of Care Review  Outcome: Ongoing (interventions implemented as appropriate)   06/01/18 0551   Coping/Psychosocial   Plan of Care Reviewed With patient   Plan of Care Review   Progress improving   OTHER   Outcome Summary Pt alert & oriented with family at bedside earlier this shift. VSS. Slept most of the night. Pt O2Sats dropped in to 84-88% while sleeping, Put O2 @ 2LPM/NC on pt at that time with O2Sats increasing to 94% immediately. Denies pain or discomfort. No acute ditress noted this shift. Will continue to monitor.        Problem: Cardiac Output Decreased (Adult)  Goal: Effective Tissue Perfusion  Outcome: Ongoing (interventions implemented as appropriate)      Problem: Pain, Acute (Adult)  Goal: Acceptable Pain Control/Comfort Level  Outcome: Ongoing (interventions implemented as appropriate)      Problem: Activity Intolerance (Adult)  Goal: Activity Tolerance  Outcome: Ongoing (interventions implemented as appropriate)    Goal: Effective Energy Conservation Techniques  Outcome: Ongoing (interventions implemented as appropriate)      Problem: Diabetes, Type 2 (Adult)  Goal: Signs and Symptoms of Listed Potential Problems Will be Absent, Minimized or Managed (Diabetes, Type 2)  Outcome: Ongoing (interventions implemented as appropriate)

## 2018-06-01 NOTE — PAYOR COMM NOTE
"Joshua Cuevas  (42 y.o. Male)     PLEASE SEE ATTACHED CLN REVIEW. PT. REMAINS IN A MONITORED TELEM FLOOR BED.     REF#[id number]    PLEASE CALL   OR  [phone] WITH INPT CONTINUED STAY AUTH AND DAYS APPROVED.     THANK YOU    EVELYN CHEATHAM, ABIDAN, CCP    Date of Birth Social Security Number Address Home Phone MRN    1975  [address] [place] [address] [phone] [id number]    [employer] Marital Status          None        Admission Date Admission Type Admitting Provider Attending Provider Department, Room/Bed    5/29/18 Urgent Ralph Hall MD Ramaswamy, Rajanna B, MD [address], 537/1    Discharge Date Discharge Disposition Discharge Destination                       Attending Provider:  Ralph Hall MD    Allergies:  Penicillins    Isolation:  None   Infection:  None   Code Status:  FULL    Ht:  188 cm (74.02\")   Wt:  129 kg (284 lb 6.3 oz)    Admission Cmt:  AETNA   Principal Problem:  Pericardial effusion [I31.3]                 Active Insurance as of 5/29/2018     Primary Coverage     Payor Plan Insurance Group Employer/Plan Group    [employer] BLUE [employer]OSS [employer] BLUE CROSS BLUE [employer]IELD PPO [id number]     Payor Plan Address Payor Plan Phone Number Effective From Effective To    [address] [phone] 4/1/2018     [facility] [id number]       Subscriber Name Subscriber Birth Date Member ID       JOSHUA CUEVAS 1975 [id number]                 Emergency Contacts      (Rel.) Home Phone Work Phone Mobile Phone    Mei Cuevas (Spouse) [phone] -- --              Intake & Output (last day)       05/31 0701 - 06/01 0700 06/01 0701 - 06/02 0700    P.O.  600    Total Intake(mL/kg)  600 (4.7)    Urine (mL/kg/hr) 1175 (0.4)     Total Output 1175      Net -1175 +600          Unmeasured Urine Occurrence  1 x        Lines, Drains & Airways    Active LDAs     Name:   Placement date:   Placement time:   Site:   Days:    " Peripheral IV 18 0412 Right Forearm  18    0412    Forearm    1                   Physician Progress Notes (last 24 hours) (Notes from 2018  2:40 PM through 2018  2:40 PM)      Ralph Hall MD at 2018 11:53 AM              Name: Joshua Sanchez ADMIT: 2018   : 1975  PCP: MIKEL Shi    MRN: [id number] LOS: 3 days   AGE/SEX: 42 y.o. male    ROOM: [facility]   Subjective   Pericardial effusion and severe hypothyroidism secondary to noncompliance with medications  He feels sleepy today but the has no complaints    Brief hospital course since admission:  Morbid obesity  Hypothyroidism  Suspected sleep apnea  Diabetes mellitus  Pericardial effusion  Cardiomyopathy with ejection fraction 30%    I have reviewed past medical history, social hisotory, family history, allergies.  No changes from admission note.      Review of Systems   Constitutional: Positive for fatigue.   Respiratory: Negative for shortness of breath.    Cardiovascular: Negative for chest pain and palpitations.          Objective   Vital Signs  Temp:  [98.4 °F (36.9 °C)-98.6 °F (37 °C)] 98.5 °F (36.9 °C)  Heart Rate:  [80-89] 89  Resp:  [16-18] 16  BP: (120-143)/() 125/97  SpO2:  [90 %-94 %] 92 %  on  Flow (L/min):  [1.5-2] 2;   Device (Oxygen Therapy): room air  Body mass index is 36.5 kg/m².    Intake/Output Summary (Last 24 hours) at 18 1153  Last data filed at 18 0900   Gross per 24 hour   Intake              360 ml   Output             1175 ml   Net             -815 ml       Physical Exam   Constitutional: He is oriented to person, place, and time. He appears well-developed and well-nourished.   BMI 38   HENT:   Head: Atraumatic.   Oral airway Mallampati class IV   Eyes: Pupils are equal, round, and reactive to light. No scleral icterus.   Cardiovascular: Normal rate and regular rhythm.    Pulmonary/Chest: No accessory muscle usage. No respiratory distress. He has no decreased  breath sounds. He has no wheezes.   Abdominal: Soft. Normal appearance and bowel sounds are normal. He exhibits no ascites. There is no hepatosplenomegaly.   Neurological: He is alert and oriented to person, place, and time.   Skin: No laceration and no petechiae noted. No cyanosis. Nails show no clubbing.   Psychiatric: He has a normal mood and affect. His behavior is normal.   Vitals reviewed.      Results Review:      Results from last 7 days  Lab Units 05/29/18  0301 05/28/18  1745   WBC 10*3/mm3 12.97* 13.01*   HEMOGLOBIN g/dL 14.6 15.6   HEMATOCRIT % 42.4 44.8   PLATELETS 10*3/mm3 242 284       Results from last 7 days  Lab Units 06/01/18  0451 05/31/18  0430 05/30/18  0802   SODIUM mmol/L 140 139 139   POTASSIUM mmol/L 4.0 3.8 3.7   CHLORIDE mmol/L 98 95* 96*   CO2 mmol/L 29.8* 29.9* 31.5*   BUN mg/dL 21* 20 16   CREATININE mg/dL 1.68* 1.67* 1.52*   GLUCOSE mg/dL 136* 136* 145*   CALCIUM mg/dL 9.4 9.8 9.5       Results from last 7 days  Lab Units 05/29/18  0301   INR  0.96       Results from last 7 days  Lab Units 05/31/18  0430 05/29/18  0301   TSH mIU/mL  --  >100.010*   T3 FREE pg/mL 1.58*  --    FREE T4 ng/dL 0.32* <0.10*       Hemoglobin A1C:  Lab Results   Component Value Date    HGBA1C 7.70 (H) 05/29/2018     Glucose Range:  Glucose   Date/Time Value Ref Range Status   06/01/2018 1149 118 70 - 130 mg/dL Final   06/01/2018 0639 127 70 - 130 mg/dL Final   05/31/2018 2122 166 (H) 70 - 130 mg/dL Final   05/31/2018 1554 123 70 - 130 mg/dL Final   05/31/2018 1115 176 (H) 70 - 130 mg/dL Final   05/31/2018 0553 137 (H) 70 - 130 mg/dL Final         amLODIPine 10 mg Oral Daily   cetirizine 10 mg Oral Daily   DULoxetine 60 mg Oral Daily   enoxaparin 40 mg Subcutaneous Q24H   famotidine 40 mg Oral Daily   insulin aspart 0-9 Units Subcutaneous 4x Daily With Meals & Nightly   insulin detemir 35 Units Subcutaneous Nightly   levothyroxine sodium 200 mcg Intravenous Daily   liothyronine 15 mcg Oral BID With Meals    lisinopril 10 mg Oral Q24H   nicotine 1 patch Transdermal Q24H      Diet Regular; Consistent Carbohydrate, Cardiac  Assessment/Plan       Assessment/Plan     Active Hospital Problems (** Indicates Principal Problem)    Diagnosis Date Noted   • **Pericardial effusion [I31.3] 05/28/2018   • Acquired hypothyroidism [E03.9] 05/29/2018     Priority: High   • Cardiomyopathy [I42.9] 05/29/2018   • Hemoptysis [R04.2] 05/29/2018   • Type 2 diabetes mellitus [E11.9] 05/29/2018   • LORENZA (acute kidney injury) [N17.9] 05/29/2018   • HYACINTH (obstructive sleep apnea) [G47.33] 05/29/2018   • Alopecia totalis [L63.0] 05/29/2018   • Rheumatoid arthritis [M06.9] 05/29/2018   • Chest pain [R07.9] 05/29/2018      Resolved Hospital Problems    Diagnosis Date Noted Date Resolved   No resolved problems to display.     Patient's A1c 7.7   Continue levothyroxine per endocrinology  Hemoptysis resolved  Needs sleep study as an outpatient which will be arranged at the time of discharge and follow-up [facility].  Thyroid levels improving, monitor daily    Patient is ambulating, will go home when endo changes his IV levothyroxine to PO    Ralph Hall MD  [facility]  06/01/18    Electronically signed by Rlaph Hall MD at 6/1/2018 11:59 AM     Agus Hanks MD at 6/1/2018 10:34 AM        I will check a limited echo today to re-evaluate LVEF and pericardial effusion.      Ultimately, if his EF remains low, he would need cor angio but not until his thyroid function is greatly improved, so this would be as an outpatient.     Add:    He has a moderate effusion without tamponade.  His LVEF is 40%.     From my standpoint, he can be discharged at any time.  My office will call him to arrange follow up with me in [place] in a few weeks.    Electronically signed by Agus Hanks MD at 6/1/2018 11:32 AM

## 2018-06-01 NOTE — PROGRESS NOTES
Continued Stay Note  [facility]     Patient Name: Joshua Sanchez  MRN: [id number]  Today's Date: 6/1/2018    Admit Date: 5/29/2018          Discharge Plan     Row Name 06/01/18 1441       Plan    Plan Home with wife's assist    Patient/Family in Agreement with Plan yes    Plan Comments Followed up with patient and he plans home with wife's assist and no needs. Have seen patient ad hosea in hallways. CCP will continue to follow and assist as needed.               Discharge Codes    No documentation.           Drew Carlin RN

## 2018-06-01 NOTE — PROGRESS NOTES
I will check a limited echo today to re-evaluate LVEF and pericardial effusion.      Ultimately, if his EF remains low, he would need cor angio but not until his thyroid function is greatly improved, so this would be as an outpatient.     Add:    He has a moderate effusion without tamponade.  His LVEF is 40%.     From my standpoint, he can be discharged at any time.  My office will call him to arrange follow up with me in [place] in a few weeks.

## 2018-06-02 PROBLEM — L63.0 ALOPECIA TOTALIS: Status: ACTIVE | Noted: 2018-06-02

## 2018-06-02 PROBLEM — E03.9 MYXEDEMA HEART DISEASE: Status: ACTIVE | Noted: 2018-06-02

## 2018-06-02 PROBLEM — I51.9 MYXEDEMA HEART DISEASE: Status: ACTIVE | Noted: 2018-06-02

## 2018-06-02 LAB
ALBUMIN UR-MCNC: 1.5 MG/L
ANION GAP SERPL CALCULATED.3IONS-SCNC: 12.2 MMOL/L
BUN BLD-MCNC: 23 MG/DL (ref 6–20)
BUN/CREAT SERPL: 14.6 (ref 7–25)
CALCIUM SPEC-SCNC: 9.2 MG/DL (ref 8.6–10.5)
CHLORIDE SERPL-SCNC: 101 MMOL/L (ref 98–107)
CO2 SERPL-SCNC: 28.8 MMOL/L (ref 22–29)
CREAT BLD-MCNC: 1.57 MG/DL (ref 0.76–1.27)
CREAT UR-MCNC: 156.3 MG/DL
GFR SERPL CREATININE-BSD FRML MDRD: 49 ML/MIN/1.73
GLUCOSE BLD-MCNC: 141 MG/DL (ref 65–99)
GLUCOSE BLDC GLUCOMTR-MCNC: 137 MG/DL (ref 70–130)
GLUCOSE BLDC GLUCOMTR-MCNC: 157 MG/DL (ref 70–130)
GLUCOSE BLDC GLUCOMTR-MCNC: 162 MG/DL (ref 70–130)
MICROALBUMIN/CREAT UR: 9.6 MG/G
POTASSIUM BLD-SCNC: 4 MMOL/L (ref 3.5–5.2)
SODIUM BLD-SCNC: 142 MMOL/L (ref 136–145)
T3FREE SERPL-MCNC: 2.24 PG/ML (ref 2–4.4)
T4 FREE SERPL-MCNC: 0.54 NG/DL (ref 0.93–1.7)

## 2018-06-02 PROCEDURE — 80048 BASIC METABOLIC PNL TOTAL CA: CPT | Performed by: INTERNAL MEDICINE

## 2018-06-02 PROCEDURE — 82043 UR ALBUMIN QUANTITATIVE: CPT | Performed by: INTERNAL MEDICINE

## 2018-06-02 PROCEDURE — 63710000001 INSULIN ASPART PER 5 UNITS: Performed by: INTERNAL MEDICINE

## 2018-06-02 PROCEDURE — 82570 ASSAY OF URINE CREATININE: CPT | Performed by: INTERNAL MEDICINE

## 2018-06-02 PROCEDURE — 84439 ASSAY OF FREE THYROXINE: CPT | Performed by: INTERNAL MEDICINE

## 2018-06-02 PROCEDURE — 25010000002 ENOXAPARIN PER 10 MG: Performed by: INTERNAL MEDICINE

## 2018-06-02 PROCEDURE — 94799 UNLISTED PULMONARY SVC/PX: CPT

## 2018-06-02 PROCEDURE — 94762 N-INVAS EAR/PLS OXIMTRY CONT: CPT

## 2018-06-02 PROCEDURE — 84481 FREE ASSAY (FT-3): CPT | Performed by: INTERNAL MEDICINE

## 2018-06-02 PROCEDURE — 82962 GLUCOSE BLOOD TEST: CPT

## 2018-06-02 PROCEDURE — 99232 SBSQ HOSP IP/OBS MODERATE 35: CPT | Performed by: INTERNAL MEDICINE

## 2018-06-02 RX ORDER — LEVOTHYROXINE SODIUM ANHYDROUS 100 UG/5ML
200 INJECTION, POWDER, LYOPHILIZED, FOR SOLUTION INTRAVENOUS
Status: COMPLETED | OUTPATIENT
Start: 2018-06-02 | End: 2018-06-02

## 2018-06-02 RX ORDER — ACETAMINOPHEN 325 MG/1
650 TABLET ORAL EVERY 4 HOURS PRN
Status: DISCONTINUED | OUTPATIENT
Start: 2018-06-02 | End: 2018-06-04 | Stop reason: HOSPADM

## 2018-06-02 RX ADMIN — NICOTINE 1 PATCH: 21 PATCH, EXTENDED RELEASE TRANSDERMAL at 20:37

## 2018-06-02 RX ADMIN — INSULIN ASPART 2 UNITS: 100 INJECTION, SOLUTION INTRAVENOUS; SUBCUTANEOUS at 21:22

## 2018-06-02 RX ADMIN — LEVOTHYROXINE SODIUM ANHYDROUS 200 MCG: 100 INJECTION, POWDER, LYOPHILIZED, FOR SOLUTION INTRAVENOUS at 11:51

## 2018-06-02 RX ADMIN — ACETAMINOPHEN 650 MG: 325 TABLET ORAL at 08:27

## 2018-06-02 RX ADMIN — FAMOTIDINE 40 MG: 40 TABLET, FILM COATED ORAL at 08:27

## 2018-06-02 RX ADMIN — ENOXAPARIN SODIUM 40 MG: 40 INJECTION SUBCUTANEOUS at 14:28

## 2018-06-02 RX ADMIN — LIOTHYRONINE SODIUM 15 MCG: 5 TABLET ORAL at 08:28

## 2018-06-02 RX ADMIN — DULOXETINE HYDROCHLORIDE 60 MG: 60 CAPSULE, DELAYED RELEASE ORAL at 08:27

## 2018-06-02 RX ADMIN — LISINOPRIL 10 MG: 10 TABLET ORAL at 08:28

## 2018-06-02 RX ADMIN — AMLODIPINE BESYLATE 10 MG: 10 TABLET ORAL at 08:27

## 2018-06-02 RX ADMIN — INSULIN ASPART 2 UNITS: 100 INJECTION, SOLUTION INTRAVENOUS; SUBCUTANEOUS at 08:28

## 2018-06-02 RX ADMIN — CETIRIZINE HYDROCHLORIDE 10 MG: 10 TABLET, FILM COATED ORAL at 08:27

## 2018-06-02 NOTE — PROGRESS NOTES
LOS: 4 days   Patient Care Team:  MIKEL Shi as PCP - General (Family Medicine)  Matthew Hannon MD as Consulting Physician (Hematology and Oncology)  MIKEL Shi as Referring Physician (Family Medicine)    Subjective   She notes he is doing better he's very anxious to go home.  He's had no hemoptysis now for almost 3 days.  No chest pain no shortness of breath he's been up walking the halls.      Review of Systems:          Objective     Vital Signs  Vital Sign Min/Max for last 24 hours  Temp  Min: 98.4 °F (36.9 °C)  Max: 98.5 °F (36.9 °C)   BP  Min: 112/83  Max: 124/77   Pulse  Min: 81  Max: 89   Resp  Min: 16  Max: 18   SpO2  Min: 84 %  Max: 96 %   Flow (L/min)  Min: 1  Max: 2   No Data Recorded        Ventilator/Non-Invasive Ventilation Settings     None                       Body mass index is 36.5 kg/m².  I/O last 3 completed shifts:  In: 840 [P.O.:840]  Out: 1275 [Urine:1275]  No intake/output data recorded.        Physical Exam:  General Appearance: well Developed white male resting comfortably in bed in no apparent distress  Eyes: Conjunctiva are clear and anicteric  ENT: Membranes are moist no erythema or exudates Mallampati 2 almost 3 airway  Neck: Trachea midline no visible jugular venous distention no palpable adenopathy or thyromegaly  Lungs: Clear nonlabored symmetric expansion no wheezes rales he did have a rhonchi in the right base that coughing cleared.  Cardiac: Regular rate and rhythm no murmur or gallop  Abdomen: Soft no palpable organomegaly or masses  : Not examined  Musc/Skel: Grossly normal  Skin: Alopecia  Neuro: Alert cooperative grossly intact  Extremities/P Vascular: No clubbing or cyanosis, No edema .  Palpable radial and dorsalis pedis pulses  MSE: He is getting to be much more animated joking interactive       Labs:    Results from last 7 days  Lab Units 06/02/18  0451 06/01/18  0451 05/31/18  0430 05/30/18  0802 05/29/18  1318  05/29/18 0301 05/28/18  1745   GLUCOSE mg/dL 141* 136* 136* 145* 135* 163* 158*   SODIUM mmol/L 142 140 139 139 143 140 144   POTASSIUM mmol/L 4.0 4.0 3.8 3.7 3.7 3.6 4.1   CO2 mmol/L 28.8 29.8* 29.9* 31.5* 33.7* 30.7* 31.8*   CHLORIDE mmol/L 101 98 95* 96* 100 99 99   ANION GAP mmol/L 12.2 12.2 14.1 11.5 9.3 10.3 13.2   CREATININE mg/dL 1.57* 1.68* 1.67* 1.52* 1.64* 1.46* 1.48*   BUN mg/dL 23* 21* 20 16 15 13 14   BUN / CREAT RATIO  14.6 12.5 12.0 10.5 9.1 8.9 9.5   CALCIUM mg/dL 9.2 9.4 9.8 9.5 9.8 9.3 10.0   EGFR IF NONAFRICN AM mL/min/1.73 49* 45* 45* 51* 46* 53* 52*   ALK PHOS U/L  --   --   --   --   --   --  60   TOTAL PROTEIN g/dL  --   --   --   --   --   --  7.0   ALT (SGPT) U/L  --   --   --   --   --   --  67*   AST (SGOT) U/L  --   --   --   --   --   --  68*   BILIRUBIN mg/dL  --   --   --   --   --   --  0.8   ALBUMIN g/dL  --   --   --   --   --   --  5.00   GLOBULIN gm/dL  --   --   --   --   --   --  2.0   A/G RATIO g/dL  --   --   --   --   --   --  2.5     Estimated Creatinine Clearance: 87.6 mL/min (A) (by C-G formula based on SCr of 1.57 mg/dL (H)).        Results from last 7 days  Lab Units 05/29/18 0301 05/28/18  1745   WBC 10*3/mm3 12.97* 13.01*   RBC 10*6/mm3 4.42* 4.72   HEMOGLOBIN g/dL 14.6 15.6   HEMATOCRIT % 42.4 44.8   MCV fL 95.9* 94.9*   MCH pg 33.0* 33.1*   MCHC g/dL 34.4 34.8   RDW % 13.6 13.3   RDW-SD fl 48.5 46.5   MPV fL 9.3 9.8   PLATELETS 10*3/mm3 242 284   NEUTROPHIL % % 57.1 63.0   LYMPHOCYTE % % 32.5 27.4   MONOCYTES % % 5.0 4.7   EOSINOPHIL % % 4.5* 3.8   BASOPHIL % % 0.5 0.6   IMM GRAN % % 0.4 0.5   NEUTROS ABS 10*3/mm3 7.40 8.18   LYMPHS ABS 10*3/mm3 4.21 3.57   MONOS ABS 10*3/mm3 0.65 0.61   EOS ABS 10*3/mm3 0.59* 0.50*   BASOS ABS 10*3/mm3 0.07 0.08   IMMATURE GRANS (ABS) 10*3/mm3 0.05* 0.07*   NRBC /100 WBC 0.0 0.0           Results from last 7 days  Lab Units 05/29/18  0301 05/28/18  1745   TROPONIN T ng/mL 0.018 0.024       Results from last 7 days  Lab Units  05/28/18  1745   PROBNP pg/mL 81.0       Results from last 7 days  Lab Units 06/02/18  0451 06/01/18  1316 05/31/18  0430 05/29/18  0301   TSH mIU/mL  --   --   --  >100.010*   T3 FREE pg/mL 2.24 1.91* 1.58*  --    FREE T4 ng/dL 0.54* 0.78* 0.32* <0.10*       Results from last 7 days  Lab Units 05/29/18  0301   PROCALCITONIN ng/mL 0.10       Results from last 7 days  Lab Units 05/29/18  0301   INR  0.96     Microbiology Results (last 10 days)     Procedure Component Value - Date/Time    Respiratory Panel, PCR - Swab, Nasopharynx [[id number]]  (Normal) Collected:  05/29/18 0658    Lab Status:  Final result Specimen:  Swab from Nasopharynx Updated:  05/29/18 1334     ADENOVIRUS, PCR Not Detected     Coronavirus 229E Not Detected     Coronavirus HKU1 Not Detected     Coronavirus NL63 Not Detected     Coronavirus OC43 Not Detected     Human Metapneumovirus Not Detected     Human Rhinovirus/Enterovirus Not Detected     Influenza B PCR Not Detected     Parainfluenza Virus 1 Not Detected     Parainfluenza Virus 2 Not Detected     Parainfluenza Virus 3 Not Detected     Parainfluenza Virus 4 Not Detected     Bordetella pertussis pcr Not Detected     Influenza A H1 2009 PCR Not Detected     Chlamydophila pneumoniae PCR Not Detected     Mycoplasma pneumo by PCR Not Detected     Influenza A PCR Not Detected     Influenza A H3 Not Detected     Influenza A H1 Not Detected     RSV, PCR Not Detected                amLODIPine 10 mg Oral Daily   cetirizine 10 mg Oral Daily   DULoxetine 60 mg Oral Daily   enoxaparin 40 mg Subcutaneous Q24H   famotidine 40 mg Oral Daily   insulin aspart 0-9 Units Subcutaneous 4x Daily With Meals & Nightly   insulin detemir 37 Units Subcutaneous Nightly   [START ON 6/3/2018] levothyroxine 225 mcg Oral Q AM   lisinopril 10 mg Oral Q24H   nicotine 1 patch Transdermal Q24H          Diagnostics:  Xr Chest 2 View    Result Date: 5/29/2018  PA AND LATERAL CHEST.  DATE: 05/28/2018 at 1825 hours.  HISTORY:  Hemoptysis which began at 3:30 PM tonight.  COMPARISON: None.  FINDINGS: Moderate generalized enlargement of the cardiomediastinal silhouette. No acute airspace disease is seen. Pulmonary vascular distribution is within normal limits. No pleural effusion, pneumothorax or acute osseous abnormality.      1. Moderate generalized enlargement of the cardiomediastinal silhouette. Findings could reflect changes of cardiac decompensation. Underlying pericardial effusion not excluded. 2. No acute airspace disease or evidence of pulmonary edema.  This report was finalized on 5/29/2018 8:55 AM by Dr. Giovanna Downey MD.      Ct Angiogram Chest With Contrast    Result Date: 5/29/2018  CTA CHEST PE PROTOCOL WITH IV CONTRAST.  DATE: 05/28/2018  HISTORY: Hemoptysis, chest pain and shortness breath which began today.  COMPARISON: PA and lateral chest radiograph 05/28/2018.  No prior CT chest at this institution for comparison.. No prior nuclear medicine myocardial perfusion scan or CT scan in the past 12 months.  TECHNIQUE: 1.3 mm axial images through the chest after intravenous contrast injection. 3-D coronal MIP reformatted images were obtained.   Radiation dose reduction techniques were utilized, including automated exposure control and exposure modulation based on body size.  FINDINGS: No pulmonary embolism is seen. Moderate concentric pericardial effusion, measuring nearly 1.5 cm thickness. No thoracic aortic aneurysm or dissection. No pleural effusion. No acute airspace disease.  Liver appears diffusely steatotic. Remainder of included upper abdominal organs appear within normal limits. Healed or healing right 8th rib fracture and 7th rib fracture anterolaterally. No pneumothorax.      1. Moderate concentric pericardial effusion measuring up to 1.5 cm thickness. 2. Healed or healing right 7th and 8th rib fractures. 3. No pulmonary embolism. 4. Preliminary report provided by Dr. Quesada on 05/28/2018 at 2052.  This report was  finalized on 5/29/2018 7:54 AM by Dr. Giovanna Downey MD.      Results for orders placed during the hospital encounter of 05/29/18   Adult Transthoracic Echo Limited W/ Cont if Necessary Per Protocol    Narrative · Left ventricular systolic function is mildly decreased. Calculated EF =   43%. Estimated EF = 40%. There is left ventricular global hypokinesis   noted. The left ventricular cavity is mildly dilated. Left ventricular   diastolic function unable to be assessed.  · There is a moderate (1-2cm) pericardial effusion. There is no evidence   of cardiac tamponade.              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         Assessment/Plan     1. Hemoptysis and not sure the etiology of his hemoptysis, he has a mildly increased white count there is no definite infiltrates or pulmonary emboli seen on CT scan.  No masses, he is a smoker he is pretty young for lung cancer.  I wonder if he just didn't have a forceful cough that caused some hemoptysis or increased pulmonary pressures with CHF.   this has completley stopped last 36 hours as he has gotten better.  If recurs needs bronch  And instructed him to call if recurs otherwise can follow up when follow up sleep study  2. Chest pain/pressure  3. Pericardial effusion per cardiology  4. Cardiomyopathy question related to hypothyroid or other  evaluation per cardiologyPlan cardiac catheterization at some point.  5. Diabetes mellitus type 2 with medical noncompliance  6. Hypothyroidism medical noncompliance  7. Hypertension  8. Hyperlipidemia  9. Rheumatoid arthritis  10. Obesity  11. Obstructive sleep apnea Sleep study in [place]. Orders entered Plans to check nocturnal oximetry  12. Tobacco abuse he needs to stop smoking but was not committing to attempting to stop.    Plan for disposition:    Ryan Sagastume MD  06/02/18  2:22 PM    Time:

## 2018-06-02 NOTE — NURSING NOTE
Received in shift report that patient's O2 sats drop in 80's while sleeping on RA. Pt's O2 sats >92% on RA while awake. Pt fell asleep around [id number], O2 sats dropped to 84% on RA. Placed on 2L N/C. Pt immediately recovered to 96% on 2L while asleep.

## 2018-06-02 NOTE — PLAN OF CARE
Problem: Patient Care Overview  Goal: Plan of Care Review  Outcome: Ongoing (interventions implemented as appropriate)   06/02/18 9341   Coping/Psychosocial   Plan of Care Reviewed With patient   Plan of Care Review   Progress improving   OTHER   Outcome Summary VSS. Patient ambulating independently around nurses station with wife. Had to be placed on 2L oxygen while sleeping due to desating into the lower to mid 80's. Labs for the morning to check thyroid function. Will continue to monitor.        Problem: Pain, Acute (Adult)  Goal: Acceptable Pain Control/Comfort Level  Outcome: Ongoing (interventions implemented as appropriate)      Problem: Activity Intolerance (Adult)  Goal: Activity Tolerance  Outcome: Ongoing (interventions implemented as appropriate)      Problem: Diabetes, Type 2 (Adult)  Goal: Signs and Symptoms of Listed Potential Problems Will be Absent, Minimized or Managed (Diabetes, Type 2)  Outcome: Ongoing (interventions implemented as appropriate)

## 2018-06-02 NOTE — PLAN OF CARE
Problem: Patient Care Overview  Goal: Plan of Care Review  Outcome: Ongoing (interventions implemented as appropriate)   06/02/18 3731   Coping/Psychosocial   Plan of Care Reviewed With patient   Plan of Care Review   Progress improving   OTHER   Outcome Summary VSS. No complaints. Pt wants to go home.     Goal: Individualization and Mutuality  Outcome: Ongoing (interventions implemented as appropriate)    Goal: Discharge Needs Assessment  Outcome: Ongoing (interventions implemented as appropriate)    Goal: Interprofessional Rounds/Family Conf  Outcome: Ongoing (interventions implemented as appropriate)      Problem: Cardiac Output Decreased (Adult)  Goal: Effective Tissue Perfusion  Outcome: Ongoing (interventions implemented as appropriate)      Problem: Pain, Acute (Adult)  Goal: Acceptable Pain Control/Comfort Level  Outcome: Ongoing (interventions implemented as appropriate)      Problem: Activity Intolerance (Adult)  Goal: Activity Tolerance  Outcome: Ongoing (interventions implemented as appropriate)    Goal: Effective Energy Conservation Techniques  Outcome: Ongoing (interventions implemented as appropriate)      Problem: Diabetes, Type 2 (Adult)  Goal: Signs and Symptoms of Listed Potential Problems Will be Absent, Minimized or Managed (Diabetes, Type 2)  Outcome: Ongoing (interventions implemented as appropriate)

## 2018-06-02 NOTE — PROGRESS NOTES
"  ENDOCRINE    Subjective   AND PLANS  Joshua Sanchez is a 42 y.o. male.     Follow-up diabetes, thyroid, and related disorders.    Feels better.  No chest pain or palpitations.  Free T3 normal.  Free T4 low.  Patient was supposed to be taking levothyroxine 225 µg per day before this admission.  He was on a lower dose before that.  Patient admits to not taking levothyroxine 225 µg for the past 2 months.  Will give 1 more dose of IV levothyroxine today and switch patient to oral levothyroxine 225 µg per day starting tomorrow.      Fasting glucose 157.  Random glucose 137-179.  Increase Levemir to 37 units every evening.  Check C-peptide and ARCENIO antibody in the morning.  Check urine microalbumin.  Will also obtain 21-hydroxylase antibody.    Objective   /83 (BP Location: Right arm, Patient Position: Lying)   Pulse 89   Temp 98.4 °F (36.9 °C) (Oral)   Resp 18   Ht 188 cm (74.02\")   Wt 129 kg (284 lb 6.3 oz)   SpO2 96%   BMI 36.50 kg/m²   Physical Exam    Awake, alert, not orthopneic not in distress  No rales or wheezes.  Regular heart rate and rhythm.  No gallop.    Abdomen soft, nontender.  No hepatojugular reflux.  No cyanosis or pedal edema.  No calf tenderness.      Lab Results (last 24 hours)     Procedure Component Value Units Date/Time    POC Glucose Once [[id number]]  (Abnormal) Collected:  06/02/18 1059    Specimen:  Blood Updated:  06/02/18 1100     Glucose 137 (H) mg/dL     Narrative:       Meter: [id number] : [id number] Cuco LEDEZMA    POC Glucose Once [[id number]]  (Abnormal) Collected:  06/02/18 0700    Specimen:  Blood Updated:  06/02/18 0701     Glucose 157 (H) mg/dL     Narrative:       Meter: [id number] : [id number] Danny Strickland CNA    T4, Free [[id number]]  (Abnormal) Collected:  06/02/18 0451    Specimen:  Blood Updated:  06/02/18 0557     Free T4 0.54 (L) ng/dL     T3, Free [[id number]]  (Normal) Collected:  06/02/18 0451    Specimen:  Blood Updated:  06/02/18 0557     T3, Free " 2.24 pg/mL     Basic Metabolic Panel [[id number]]  (Abnormal) Collected:  06/02/18 0451    Specimen:  Blood Updated:  06/02/18 0549     Glucose 141 (H) mg/dL      BUN 23 (H) mg/dL      Creatinine 1.57 (H) mg/dL      Sodium 142 mmol/L      Potassium 4.0 mmol/L      Chloride 101 mmol/L      CO2 28.8 mmol/L      Calcium 9.2 mg/dL      eGFR Non African Amer 49 (L) mL/min/1.73      BUN/Creatinine Ratio 14.6     Anion Gap 12.2 mmol/L     Narrative:       GFR Normal >60  Chronic Kidney Disease <60  Kidney Failure <15    POC Glucose Once [[id number]]  (Abnormal) Collected:  06/01/18 2049    Specimen:  Blood Updated:  06/01/18 2050     Glucose 179 (H) mg/dL     Narrative:       Meter: [id number] : [id number] Phil LEDEZMA    POC Glucose Once [[id number]]  (Abnormal) Collected:  06/01/18 1612    Specimen:  Blood Updated:  06/01/18 1613     Glucose 137 (H) mg/dL     Narrative:       Meter: [id number] : [id number] Smailagic Berina CNA    T4, Free [[id number]]  (Abnormal) Collected:  06/01/18 1316    Specimen:  Blood Updated:  06/01/18 1409     Free T4 0.78 (L) ng/dL     T3, Free [[id number]]  (Abnormal) Collected:  06/01/18 1316    Specimen:  Blood Updated:  06/01/18 1409     T3, Free 1.91 (L) pg/mL     POC Glucose Once [[id number]]  (Normal) Collected:  06/01/18 1149    Specimen:  Blood Updated:  06/01/18 1151     Glucose 118 mg/dL     Narrative:       Meter: [id number] : [id number] Smailagic Berina CNA            Principal Problem:    Pericardial effusion  Active Problems:    Hemoptysis    Primary hypothyroidism    Type 2 diabetes mellitus    Alopecia totalis    Rheumatoid arthritis    Cardiomyopathy    Chest pain    LORENZA (acute kidney injury)    HYACINTH (obstructive sleep apnea)    Myxedema heart disease    Switch to oral levothyroxine starting tomorrow.  Discontinue Cytomel.  Insulin dose adjusted.  Will check for markers of autoimmune diabetes.  Check urine microalbumin.  Check 21 hydroxylase antibody.

## 2018-06-02 NOTE — PROGRESS NOTES
Name: Joshua Sanchez ADMIT: 2018   : 1975  PCP: MIKEL Shi    MRN: [id number] LOS: 4 days   AGE/SEX: 42 y.o. male    ROOM: [facility]   Subjective   Pericardial effusion and severe hypothyroidism secondary to noncompliance with medications  Necessary and noted that his oxygen saturation goes below 88 while he is sleeping, also noted to have snoring highly suspect sleep apnea      Brief hospital course since admission:  Morbid obesity  Hypothyroidism, Myxedema  Alopecia totalis  Suspected sleep apnea  Diabetes mellitus  Pericardial effusion  Cardiomyopathy with ejection fraction 30%    I have reviewed past medical history, social hisotory, family history, allergies.  No changes from admission note.      Review of Systems   Constitutional: Positive for fatigue.   Respiratory: Negative for shortness of breath.    Cardiovascular: Negative for chest pain and palpitations.          Objective   Vital Signs  Temp:  [98.4 °F (36.9 °C)-98.5 °F (36.9 °C)] 98.4 °F (36.9 °C)  Heart Rate:  [81-89] 89  Resp:  [16-18] 18  BP: (112-124)/(77-96) 112/83  SpO2:  [84 %-96 %] 96 %  on  Flow (L/min):  [1-2] 2;   Device (Oxygen Therapy): nasal cannula  Body mass index is 36.5 kg/m².    Intake/Output Summary (Last 24 hours) at 18 1052  Last data filed at 18 1856   Gross per 24 hour   Intake              480 ml   Output              600 ml   Net             -120 ml       Physical Exam   Constitutional: He is oriented to person, place, and time. He appears well-developed and well-nourished.   BMI 38   HENT:   Head: Atraumatic.   Oral airway Mallampati class IV   Eyes: Pupils are equal, round, and reactive to light. No scleral icterus.   Cardiovascular: Normal rate and regular rhythm.    Pulmonary/Chest: No accessory muscle usage. No respiratory distress. He has no decreased breath sounds. He has no wheezes.   Abdominal: Soft. Normal appearance and bowel sounds are normal. He exhibits no ascites. There is  no hepatosplenomegaly.   Neurological: He is alert and oriented to person, place, and time.   Skin: No laceration and no petechiae noted. No cyanosis. Nails show no clubbing.   Psychiatric: He has a normal mood and affect. His behavior is normal.   Vitals reviewed.      Results Review:      Results from last 7 days  Lab Units 05/29/18  0301 05/28/18  1745   WBC 10*3/mm3 12.97* 13.01*   HEMOGLOBIN g/dL 14.6 15.6   HEMATOCRIT % 42.4 44.8   PLATELETS 10*3/mm3 242 284       Results from last 7 days  Lab Units 06/02/18  0451 06/01/18  0451 05/31/18  0430   SODIUM mmol/L 142 140 139   POTASSIUM mmol/L 4.0 4.0 3.8   CHLORIDE mmol/L 101 98 95*   CO2 mmol/L 28.8 29.8* 29.9*   BUN mg/dL 23* 21* 20   CREATININE mg/dL 1.57* 1.68* 1.67*   GLUCOSE mg/dL 141* 136* 136*   CALCIUM mg/dL 9.2 9.4 9.8       Results from last 7 days  Lab Units 05/29/18  0301   INR  0.96       Results from last 7 days  Lab Units 06/02/18  0451  05/29/18  0301   TSH mIU/mL  --   --  >100.010*   T3 FREE pg/mL 2.24  < >  --    FREE T4 ng/dL 0.54*  < > <0.10*   < > = values in this interval not displayed.    Hemoglobin A1C:  Lab Results   Component Value Date    HGBA1C 7.70 (H) 05/29/2018     Glucose Range:  Glucose   Date/Time Value Ref Range Status   06/02/2018 0700 157 (H) 70 - 130 mg/dL Final   06/01/2018 2049 179 (H) 70 - 130 mg/dL Final   06/01/2018 1612 137 (H) 70 - 130 mg/dL Final   06/01/2018 1149 118 70 - 130 mg/dL Final   06/01/2018 0639 127 70 - 130 mg/dL Final   05/31/2018 2122 166 (H) 70 - 130 mg/dL Final         amLODIPine 10 mg Oral Daily   cetirizine 10 mg Oral Daily   DULoxetine 60 mg Oral Daily   enoxaparin 40 mg Subcutaneous Q24H   famotidine 40 mg Oral Daily   insulin aspart 0-9 Units Subcutaneous 4x Daily With Meals & Nightly   insulin detemir 35 Units Subcutaneous Nightly   levothyroxine sodium 200 mcg Intravenous Daily   liothyronine 15 mcg Oral BID With Meals   lisinopril 10 mg Oral Q24H   nicotine 1 patch Transdermal Q24H      Diet  Regular; Consistent Carbohydrate, Cardiac  Assessment/Plan       Assessment/Plan      Active Hospital Problems (** Indicates Principal Problem)    Diagnosis Date Noted   • **Pericardial effusion [I31.3] 05/28/2018   • Acquired hypothyroidism [E03.9] 05/29/2018     Priority: High   • 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     Patient's A1c 7.7   Continue levothyroxine per endocrinology  Hemoptysis resolved  Needs sleep study as an outpatient which will be arranged at the time of discharge and follow-up [place] [facility].  Thyroid levels improving, monitor daily    Nighttime oximetry on room air and possibly home with an oxygen at nighttime until the sleep studies done.  This sleep study will be ordered and followed up Lety Julio by reji Hall MD  [facility]  06/02/18

## 2018-06-03 LAB
ANION GAP SERPL CALCULATED.3IONS-SCNC: 11.6 MMOL/L
BUN BLD-MCNC: 23 MG/DL (ref 6–20)
BUN/CREAT SERPL: 15.9 (ref 7–25)
CALCIUM SPEC-SCNC: 9.4 MG/DL (ref 8.6–10.5)
CHLORIDE SERPL-SCNC: 100 MMOL/L (ref 98–107)
CO2 SERPL-SCNC: 30.4 MMOL/L (ref 22–29)
CREAT BLD-MCNC: 1.45 MG/DL (ref 0.76–1.27)
GFR SERPL CREATININE-BSD FRML MDRD: 53 ML/MIN/1.73
GLUCOSE BLD-MCNC: 108 MG/DL (ref 65–99)
GLUCOSE BLDC GLUCOMTR-MCNC: 119 MG/DL (ref 70–130)
GLUCOSE BLDC GLUCOMTR-MCNC: 142 MG/DL (ref 70–130)
GLUCOSE BLDC GLUCOMTR-MCNC: 167 MG/DL (ref 70–130)
GLUCOSE BLDC GLUCOMTR-MCNC: 97 MG/DL (ref 70–130)
PLATELET # BLD AUTO: 235 10*3/MM3 (ref 140–500)
POTASSIUM BLD-SCNC: 3.9 MMOL/L (ref 3.5–5.2)
SODIUM BLD-SCNC: 142 MMOL/L (ref 136–145)
T3FREE SERPL-MCNC: 2.27 PG/ML (ref 2–4.4)
T4 FREE SERPL-MCNC: 0.68 NG/DL (ref 0.93–1.7)

## 2018-06-03 PROCEDURE — 84439 ASSAY OF FREE THYROXINE: CPT | Performed by: INTERNAL MEDICINE

## 2018-06-03 PROCEDURE — 25010000002 ENOXAPARIN PER 10 MG: Performed by: INTERNAL MEDICINE

## 2018-06-03 PROCEDURE — 84481 FREE ASSAY (FT-3): CPT | Performed by: INTERNAL MEDICINE

## 2018-06-03 PROCEDURE — 80048 BASIC METABOLIC PNL TOTAL CA: CPT | Performed by: INTERNAL MEDICINE

## 2018-06-03 PROCEDURE — 63710000001 INSULIN ASPART PER 5 UNITS: Performed by: INTERNAL MEDICINE

## 2018-06-03 PROCEDURE — 86341 ISLET CELL ANTIBODY: CPT | Performed by: INTERNAL MEDICINE

## 2018-06-03 PROCEDURE — 82962 GLUCOSE BLOOD TEST: CPT

## 2018-06-03 PROCEDURE — 84681 ASSAY OF C-PEPTIDE: CPT | Performed by: INTERNAL MEDICINE

## 2018-06-03 PROCEDURE — 99232 SBSQ HOSP IP/OBS MODERATE 35: CPT | Performed by: INTERNAL MEDICINE

## 2018-06-03 PROCEDURE — 85049 AUTOMATED PLATELET COUNT: CPT | Performed by: INTERNAL MEDICINE

## 2018-06-03 RX ADMIN — FAMOTIDINE 40 MG: 40 TABLET, FILM COATED ORAL at 08:20

## 2018-06-03 RX ADMIN — AMLODIPINE BESYLATE 10 MG: 10 TABLET ORAL at 08:20

## 2018-06-03 RX ADMIN — DULOXETINE HYDROCHLORIDE 60 MG: 60 CAPSULE, DELAYED RELEASE ORAL at 08:20

## 2018-06-03 RX ADMIN — INSULIN ASPART 2 UNITS: 100 INJECTION, SOLUTION INTRAVENOUS; SUBCUTANEOUS at 21:27

## 2018-06-03 RX ADMIN — ENOXAPARIN SODIUM 40 MG: 40 INJECTION SUBCUTANEOUS at 14:16

## 2018-06-03 RX ADMIN — LISINOPRIL 10 MG: 10 TABLET ORAL at 08:20

## 2018-06-03 RX ADMIN — CETIRIZINE HYDROCHLORIDE 10 MG: 10 TABLET, FILM COATED ORAL at 08:20

## 2018-06-03 RX ADMIN — NICOTINE 1 PATCH: 21 PATCH, EXTENDED RELEASE TRANSDERMAL at 20:02

## 2018-06-03 RX ADMIN — LEVOTHYROXINE SODIUM 225 MCG: 100 TABLET ORAL at 05:53

## 2018-06-03 NOTE — PLAN OF CARE
Problem: Patient Care Overview  Goal: Plan of Care Review  Outcome: Ongoing (interventions implemented as appropriate)   06/03/18 0509   Coping/Psychosocial   Plan of Care Reviewed With patient   Plan of Care Review   Progress improving   OTHER   Outcome Summary VSS. Overnight oximetry study completed. Labs for the morning. Possible d/c today. Will continue to monitor.        Problem: Pain, Acute (Adult)  Goal: Acceptable Pain Control/Comfort Level  Outcome: Ongoing (interventions implemented as appropriate)      Problem: Activity Intolerance (Adult)  Goal: Activity Tolerance  Outcome: Ongoing (interventions implemented as appropriate)      Problem: Diabetes, Type 2 (Adult)  Goal: Signs and Symptoms of Listed Potential Problems Will be Absent, Minimized or Managed (Diabetes, Type 2)  Outcome: Ongoing (interventions implemented as appropriate)

## 2018-06-03 NOTE — PROGRESS NOTES
Continued Stay Note  [facility]     Patient Name: Joshua Sanchez  MRN: [id number]  Today's Date: 6/3/2018    Admit Date: 5/29/2018          Discharge Plan     Row Name 06/03/18 1256       Plan    Plan Home with wife and 02 setup-    Patient/Family in Agreement with Plan yes    Plan Comments CCP consulted for nocturnal o2. CCP spoke with pt and wife via inbound call to patients room. Pts wife states they would like to use [employer] [facility] which is located inside of [facility] ([phone]). CCP called and left message requesting call back to setup for DME. MARTI mathis/ccp               Discharge Codes    No documentation.           Paula Rodriguez, RN

## 2018-06-03 NOTE — PROGRESS NOTES
LOS: 5 days   Patient Care Team:  MIKEL Shi as PCP - General (Family Medicine)  Matthew Hannon MD as Consulting Physician (Hematology and Oncology)  MIKEL Shi as Referring Physician (Family Medicine)    Subjective   Hoping to go home.  No cough no hemoptysis.  He has been walking the halls by 8:30 this morning made at least 8 laps around the nursing station.    Review of Systems:          Objective     Vital Signs  Vital Sign Min/Max for last 24 hours  Temp  Min: 97.7 °F (36.5 °C)  Max: 98.5 °F (36.9 °C)   BP  Min: 123/96  Max: 143/94   Pulse  Min: 83  Max: 93   Resp  Min: 18  Max: 20   SpO2  Min: 92 %  Max: 96 %   No Data Recorded   No Data Recorded        Ventilator/Non-Invasive Ventilation Settings     None                       Body mass index is 36.5 kg/m².  No intake/output data recorded.  No intake/output data recorded.        Physical Exam:  General Appearance: well Developed white male resting comfortably in bed in no apparent distress  Eyes: Conjunctiva are clear and anicteric  ENT: Membranes are moist no erythema or exudates Mallampati 2 almost 3 airway  Neck: Trachea midline no visible jugular venous distention no palpable adenopathy or thyromegaly  Lungs: Clear nonlabored symmetric expansion no wheezes rales he did have a rhonchi in the right base that coughing cleared.  Cardiac: Regular rate and rhythm no murmur or gallop  Abdomen: Soft no palpable organomegaly or masses  : Not examined  Musc/Skel: Grossly normal  Skin: Alopecia  Neuro: Alert cooperative grossly intact  Extremities/P Vascular: No clubbing or cyanosis, No edema .  Palpable radial and dorsalis pedis pulses  MSE:He is a little frustrated wants to go home.       Labs:    Results from last 7 days  Lab Units 06/03/18  0411 06/02/18  0451 06/01/18  0451 05/31/18  0430 05/30/18  0802 05/29/18  1318 05/29/18  0301 05/28/18  1745   GLUCOSE mg/dL 108* 141* 136* 136* 145* 135* 163* 158*   SODIUM  mmol/L 142 142 140 139 139 143 140 144   POTASSIUM mmol/L 3.9 4.0 4.0 3.8 3.7 3.7 3.6 4.1   CO2 mmol/L 30.4* 28.8 29.8* 29.9* 31.5* 33.7* 30.7* 31.8*   CHLORIDE mmol/L 100 101 98 95* 96* 100 99 99   ANION GAP mmol/L 11.6 12.2 12.2 14.1 11.5 9.3 10.3 13.2   CREATININE mg/dL 1.45* 1.57* 1.68* 1.67* 1.52* 1.64* 1.46* 1.48*   BUN mg/dL 23* 23* 21* 20 16 15 13 14   BUN / CREAT RATIO  15.9 14.6 12.5 12.0 10.5 9.1 8.9 9.5   CALCIUM mg/dL 9.4 9.2 9.4 9.8 9.5 9.8 9.3 10.0   EGFR IF NONAFRICN AM mL/min/1.73 53* 49* 45* 45* 51* 46* 53* 52*   ALK PHOS U/L  --   --   --   --   --   --   --  60   TOTAL PROTEIN g/dL  --   --   --   --   --   --   --  7.0   ALT (SGPT) U/L  --   --   --   --   --   --   --  67*   AST (SGOT) U/L  --   --   --   --   --   --   --  68*   BILIRUBIN mg/dL  --   --   --   --   --   --   --  0.8   ALBUMIN g/dL  --   --   --   --   --   --   --  5.00   GLOBULIN gm/dL  --   --   --   --   --   --   --  2.0   A/G RATIO g/dL  --   --   --   --   --   --   --  2.5     Estimated Creatinine Clearance: 94.8 mL/min (A) (by C-G formula based on SCr of 1.45 mg/dL (H)).        Results from last 7 days  Lab Units 06/03/18  0411 05/29/18  0301 05/28/18  3035   WBC 10*3/mm3  --  12.97* 13.01*   RBC 10*6/mm3  --  4.42* 4.72   HEMOGLOBIN g/dL  --  14.6 15.6   HEMATOCRIT %  --  42.4 44.8   MCV fL  --  95.9* 94.9*   MCH pg  --  33.0* 33.1*   MCHC g/dL  --  34.4 34.8   RDW %  --  13.6 13.3   RDW-SD fl  --  48.5 46.5   MPV fL  --  9.3 9.8   PLATELETS 10*3/mm3 235 242 284   NEUTROPHIL % %  --  57.1 63.0   LYMPHOCYTE % %  --  32.5 27.4   MONOCYTES % %  --  5.0 4.7   EOSINOPHIL % %  --  4.5* 3.8   BASOPHIL % %  --  0.5 0.6   IMM GRAN % %  --  0.4 0.5   NEUTROS ABS 10*3/mm3  --  7.40 8.18   LYMPHS ABS 10*3/mm3  --  4.21 3.57   MONOS ABS 10*3/mm3  --  0.65 0.61   EOS ABS 10*3/mm3  --  0.59* 0.50*   BASOS ABS 10*3/mm3  --  0.07 0.08   IMMATURE GRANS (ABS) 10*3/mm3  --  0.05* 0.07*   NRBC /100 WBC  --  0.0 0.0           Results from  last 7 days  Lab Units 05/29/18  0301 05/28/18  1745   TROPONIN T ng/mL 0.018 0.024       Results from last 7 days  Lab Units 05/28/18  1745   PROBNP pg/mL 81.0       Results from last 7 days  Lab Units 06/03/18  0411 06/02/18  0451 06/01/18  1316 05/31/18  0430 05/29/18  0301   TSH mIU/mL  --   --   --   --  >100.010*   T3 FREE pg/mL 2.27 2.24 1.91* 1.58*  --    FREE T4 ng/dL 0.68* 0.54* 0.78* 0.32* <0.10*       Results from last 7 days  Lab Units 05/29/18  0301   PROCALCITONIN ng/mL 0.10       Results from last 7 days  Lab Units 05/29/18  0301   INR  0.96     Microbiology Results (last 10 days)     Procedure Component Value - Date/Time    Respiratory Panel, PCR - Swab, Nasopharynx [[id number]]  (Normal) Collected:  05/29/18 0658    Lab Status:  Final result Specimen:  Swab from Nasopharynx Updated:  05/29/18 1334     ADENOVIRUS, PCR Not Detected     Coronavirus 229E Not Detected     Coronavirus HKU1 Not Detected     Coronavirus NL63 Not Detected     Coronavirus OC43 Not Detected     Human Metapneumovirus Not Detected     Human Rhinovirus/Enterovirus Not Detected     Influenza B PCR Not Detected     Parainfluenza Virus 1 Not Detected     Parainfluenza Virus 2 Not Detected     Parainfluenza Virus 3 Not Detected     Parainfluenza Virus 4 Not Detected     Bordetella pertussis pcr Not Detected     Influenza A H1 2009 PCR Not Detected     Chlamydophila pneumoniae PCR Not Detected     Mycoplasma pneumo by PCR Not Detected     Influenza A PCR Not Detected     Influenza A H3 Not Detected     Influenza A H1 Not Detected     RSV, PCR Not Detected                amLODIPine 10 mg Oral Daily   cetirizine 10 mg Oral Daily   DULoxetine 60 mg Oral Daily   enoxaparin 40 mg Subcutaneous Q24H   famotidine 40 mg Oral Daily   insulin aspart 0-9 Units Subcutaneous 4x Daily With Meals & Nightly   insulin detemir 37 Units Subcutaneous Nightly   levothyroxine 225 mcg Oral Q AM   lisinopril 10 mg Oral Q24H   nicotine 1 patch Transdermal Q24H           Diagnostics:  Xr Chest 2 View    Result Date: 5/29/2018  PA AND LATERAL CHEST.  DATE: 05/28/2018 at 1825 hours.  HISTORY: Hemoptysis which began at 3:30 PM tonight.  COMPARISON: None.  FINDINGS: Moderate generalized enlargement of the cardiomediastinal silhouette. No acute airspace disease is seen. Pulmonary vascular distribution is within normal limits. No pleural effusion, pneumothorax or acute osseous abnormality.      1. Moderate generalized enlargement of the cardiomediastinal silhouette. Findings could reflect changes of cardiac decompensation. Underlying pericardial effusion not excluded. 2. No acute airspace disease or evidence of pulmonary edema.  This report was finalized on 5/29/2018 8:55 AM by Dr. Giovanna Downey MD.      Ct Angiogram Chest With Contrast    Result Date: 5/29/2018  CTA CHEST PE PROTOCOL WITH IV CONTRAST.  DATE: 05/28/2018  HISTORY: Hemoptysis, chest pain and shortness breath which began today.  COMPARISON: PA and lateral chest radiograph 05/28/2018.  No prior CT chest at this institution for comparison.. No prior nuclear medicine myocardial perfusion scan or CT scan in the past 12 months.  TECHNIQUE: 1.3 mm axial images through the chest after intravenous contrast injection. 3-D coronal MIP reformatted images were obtained.   Radiation dose reduction techniques were utilized, including automated exposure control and exposure modulation based on body size.  FINDINGS: No pulmonary embolism is seen. Moderate concentric pericardial effusion, measuring nearly 1.5 cm thickness. No thoracic aortic aneurysm or dissection. No pleural effusion. No acute airspace disease.  Liver appears diffusely steatotic. Remainder of included upper abdominal organs appear within normal limits. Healed or healing right 8th rib fracture and 7th rib fracture anterolaterally. No pneumothorax.      1. Moderate concentric pericardial effusion measuring up to 1.5 cm thickness. 2. Healed or healing right 7th and  8th rib fractures. 3. No pulmonary embolism. 4. Preliminary report provided by Dr. Quesada on 05/28/2018 at 2052.  This report was finalized on 5/29/2018 7:54 AM by Dr. Giovanna Downey MD.      Results for orders placed during the hospital encounter of 05/29/18   Adult Transthoracic Echo Limited W/ Cont if Necessary Per Protocol    Narrative · Left ventricular systolic function is mildly decreased. Calculated EF =   43%. Estimated EF = 40%. There is left ventricular global hypokinesis   noted. The left ventricular cavity is mildly dilated. Left ventricular   diastolic function unable to be assessed.  · There is a moderate (1-2cm) pericardial effusion. There is no evidence   of cardiac tamponade.              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         Assessment/Plan     1. Hemoptysis and not sure the etiology of his hemoptysis, he has a mildly increased white count there is no definite infiltrates or pulmonary emboli seen on CT scan.  No masses, he is a smoker he is pretty young for lung cancer.  I wonder if he just didn't have a forceful cough that caused some hemoptysis or increased pulmonary pressures with CHF.   Recurrence now were well over 3 days.  I've instructed him if he has any recurrence to let us know immediately and will follow-up when he follows up with his sleep study  2. Chest  pain/pressure  3. Pericardial effusion per cardiology  4. Cardiomyopathy question related to hypothyroid or other evaluation per cardiologyPlan cardiac catheterization at some point.  5. Diabetes mellitus type 2 with medical noncompliance  6. Hypothyroidism medical noncompliance  7. Hypertension  8. Hyperlipidemia  9. Rheumatoid arthritis  10. Obesity  11. Obstructive sleep apnea Sleep study in [place].   12. Nocturnal hypoxemia patient spent over an hour and a half with his oxygen saturations less than 89% we'll try and get him oxygen for home.  It is ordered.  13. Tobacco abuse he needs to stop smoking discussed with him again today    Plan for disposition: Thing new to add we'll see when necessary    Ryan Sagastume MD  06/03/18  12:02 PM    Time:

## 2018-06-03 NOTE — PROGRESS NOTES
Name: Joshua Sanchez ADMIT: 2018   : 1975  PCP: MIKEL Shi    MRN: [id number] LOS: 5 days   AGE/SEX: 42 y.o. male    ROOM: [facility]/   Subjective   Pericardial effusion and severe hypothyroidism secondary to noncompliance with medications  Necessary and noted that his oxygen saturation goes below 88 while he is sleeping, also noted to have snoring highly suspect sleep apnea      Brief hospital course since admission:  Morbid obesity  Hypothyroidism, Myxedema  Alopecia totalis  Suspected sleep apnea  Diabetes mellitus  Pericardial effusion  Cardiomyopathy with ejection fraction 30%    I have reviewed past medical history, social hisotory, family history, allergies.  No changes from admission note.      Review of Systems   Constitutional: Positive for fatigue.   Respiratory: Negative for shortness of breath.    Cardiovascular: Negative for chest pain and palpitations.          Objective   Vital Signs  Temp:  [97.7 °F (36.5 °C)-98.5 °F (36.9 °C)] 97.9 °F (36.6 °C)  Heart Rate:  [83-93] 91  Resp:  [18-20] 20  BP: (123-143)/() 123/96  SpO2:  [92 %-96 %] 96 %  on   ;   Device (Oxygen Therapy): room air  Body mass index is 36.5 kg/m².  No intake or output data in the 24 hours ending 18 1415    Physical Exam   Constitutional: He is oriented to person, place, and time. He appears well-developed and well-nourished.   BMI 38   HENT:   Head: Atraumatic.   Oral airway Mallampati class IV   Eyes: Pupils are equal, round, and reactive to light. No scleral icterus.   Cardiovascular: Normal rate and regular rhythm.    Pulmonary/Chest: No accessory muscle usage. No respiratory distress. He has no decreased breath sounds. He has no wheezes.   Abdominal: Soft. Normal appearance and bowel sounds are normal. He exhibits no ascites. There is no hepatosplenomegaly.   Neurological: He is alert and oriented to person, place, and time.   Skin: No laceration and no petechiae noted. No cyanosis. Nails show  no clubbing.   Psychiatric: He has a normal mood and affect. His behavior is normal.   Vitals reviewed.      Results Review:      Results from last 7 days  Lab Units 06/03/18  0411 05/29/18  0301 05/28/18  1745   WBC 10*3/mm3  --  12.97* 13.01*   HEMOGLOBIN g/dL  --  14.6 15.6   HEMATOCRIT %  --  42.4 44.8   PLATELETS 10*3/mm3 235 242 284       Results from last 7 days  Lab Units 06/03/18  0411 06/02/18  0451 06/01/18  0451   SODIUM mmol/L 142 142 140   POTASSIUM mmol/L 3.9 4.0 4.0   CHLORIDE mmol/L 100 101 98   CO2 mmol/L 30.4* 28.8 29.8*   BUN mg/dL 23* 23* 21*   CREATININE mg/dL 1.45* 1.57* 1.68*   GLUCOSE mg/dL 108* 141* 136*   CALCIUM mg/dL 9.4 9.2 9.4       Results from last 7 days  Lab Units 05/29/18  0301   INR  0.96       Results from last 7 days  Lab Units 06/03/18  0411 05/29/18  0301   TSH mIU/mL  --   --  >100.010*   T3 FREE pg/mL 2.27  < >  --    FREE T4 ng/dL 0.68*  < > <0.10*   < > = values in this interval not displayed.    Hemoglobin A1C:  Lab Results   Component Value Date    HGBA1C 7.70 (H) 05/29/2018     Glucose Range:  Glucose   Date/Time Value Ref Range Status   06/03/2018 1102 142 (H) 70 - 130 mg/dL Final   06/03/2018 0557 97 70 - 130 mg/dL Final   06/02/2018 2116 162 (H) 70 - 130 mg/dL Final   06/02/2018 1059 137 (H) 70 - 130 mg/dL Final   06/02/2018 0700 157 (H) 70 - 130 mg/dL Final   06/01/2018 2049 179 (H) 70 - 130 mg/dL Final         amLODIPine 10 mg Oral Daily   cetirizine 10 mg Oral Daily   DULoxetine 60 mg Oral Daily   enoxaparin 40 mg Subcutaneous Q24H   famotidine 40 mg Oral Daily   insulin aspart 0-9 Units Subcutaneous 4x Daily With Meals & Nightly   insulin detemir 37 Units Subcutaneous Nightly   levothyroxine 225 mcg Oral Q AM   lisinopril 10 mg Oral Q24H   nicotine 1 patch Transdermal Q24H      Diet Regular; Consistent Carbohydrate, Cardiac  Assessment/Plan       Assessment/Plan      Active Hospital Problems (** Indicates Principal Problem)    Diagnosis Date Noted   •  **Pericardial effusion [I31.3] 05/28/2018   • Primary hypothyroidism [E03.9] 05/29/2018     Priority: High   • 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     Patient's A1c 7.7   Continue levothyroxine per endocrinology  Hemoptysis resolved  Needs sleep study as an outpatient which will be arranged at the time of discharge and follow-up [place] [facility].  Thyroid levels improving, monitor daily  Shift work disorder     Nighttime oximetry on room air and possibly home with an oxygen at nighttime until the sleep studies done.  This sleep study will be ordered and followed up Lety Julio by reji Hall MD  [facility]  06/03/18

## 2018-06-03 NOTE — PROGRESS NOTES
"  ENDOCRINE    Subjective   AND PLANS  Joshua Sanchez is a 42 y.o. male.     Follow-up diabetes, thyroid, and related disorders.    No chest pain or palpitations.  Patient has been started on oral levothyroxine 225 µg per day today.  Repeat thyroid function tests in 4-6 weeks and adjust dose if needed.    Fasting glucose 97.  Random glucose 142.  Continue Levemir 37 units every evening.    Urine microalbumin normal.  Continue lisinopril    C-peptide, ARCENIO antibody, and 21-hydroxylase antibody pending.    Objective   /96 (BP Location: Right arm, Patient Position: Lying)   Pulse 91   Temp 97.9 °F (36.6 °C) (Oral)   Resp 20   Ht 188 cm (74.02\")   Wt 129 kg (284 lb 6.3 oz)   SpO2 96%   BMI 36.50 kg/m²   Physical Exam    Awake, alert, not in distress.    Thyroid is not enlarged  No rales or wheezes.  Regular heart rate and rhythm.  Abdomen soft, nontender.  No cyanosis.  No edema.    Lab Results (last 24 hours)     Procedure Component Value Units Date/Time    POC Glucose Once [[id number]]  (Abnormal) Collected:  06/03/18 1102    Specimen:  Blood Updated:  06/03/18 1103     Glucose 142 (H) mg/dL     Narrative:       Meter: [id number] : [id number] Ray Betancourt CNA    Intrinsic Factor Ab [[id number]] Collected:  06/03/18 0411    Specimen:  Blood Updated:  06/03/18 0714    C-Peptide [[id number]] Collected:  06/03/18 0411    Specimen:  Blood Updated:  06/03/18 0714    Glutamic Acid Decarboxylase [[id number]] Collected:  06/03/18 0411    Specimen:  Blood Updated:  06/03/18 0714    POC Glucose Once [[id number]]  (Normal) Collected:  06/03/18 0557    Specimen:  Blood Updated:  06/03/18 0559     Glucose 97 mg/dL     Narrative:       Meter: [id number] : [id number] Mingo LEDEZMA    T4, Free [[id number]]  (Abnormal) Collected:  06/03/18 0411    Specimen:  Blood Updated:  06/03/18 0533     Free T4 0.68 (L) ng/dL     T3, Free [[id number]]  (Normal) Collected:  06/03/18 0411    Specimen:  Blood Updated:  06/03/18 " 0533     T3, Free 2.27 pg/mL     Basic Metabolic Panel [[id number]]  (Abnormal) Collected:  06/03/18 0411    Specimen:  Blood Updated:  06/03/18 0523     Glucose 108 (H) mg/dL      BUN 23 (H) mg/dL      Creatinine 1.45 (H) mg/dL      Sodium 142 mmol/L      Potassium 3.9 mmol/L      Chloride 100 mmol/L      CO2 30.4 (H) mmol/L      Calcium 9.4 mg/dL      eGFR Non African Amer 53 (L) mL/min/1.73      BUN/Creatinine Ratio 15.9     Anion Gap 11.6 mmol/L     Narrative:       GFR Normal >60  Chronic Kidney Disease <60  Kidney Failure <15    Platelet Count [[id number]]  (Normal) Collected:  06/03/18 0411    Specimen:  Blood Updated:  06/03/18 0458     Platelets 235 10*3/mm3     POC Glucose Once [[id number]]  (Abnormal) Collected:  06/02/18 2116    Specimen:  Blood Updated:  06/02/18 2117     Glucose 162 (H) mg/dL     Narrative:       Meter: [id number] : [id number] Mingo LEDEZMA    Microalbumin / Creatinine Urine Ratio - [[id number]] Collected:  06/02/18 1553    Specimen:  Urine Updated:  06/02/18 1656     Microalbumin/Creatinine Ratio 9.6 mg/g      Creatinine, Urine 156.3 mg/dL      Microalbumin, Urine 1.5 mg/L             Principal Problem:    Pericardial effusion  Active Problems:    Hemoptysis    Primary hypothyroidism    Type 2 diabetes mellitus    Alopecia totalis    Rheumatoid arthritis    Cardiomyopathy    Chest pain    LORENZA (acute kidney injury)    HYACINTH (obstructive sleep apnea)    Myxedema heart disease    Continue levothyroxine 225 µg PO daily  Insulin therapy as discussed above.  Continue lisinopril

## 2018-06-03 NOTE — PLAN OF CARE
Problem: Patient Care Overview  Goal: Plan of Care Review  Outcome: Ongoing (interventions implemented as appropriate)   06/03/18 6825   Coping/Psychosocial   Plan of Care Reviewed With patient;spouse   Plan of Care Review   Progress improving   OTHER   Outcome Summary VSS. T4 slowly trending upward. Dr. Lozoya states T4 needs to be closer to 0.9 prior to discharge. Patient appears blue and states he wants to go home.     Goal: Individualization and Mutuality  Outcome: Ongoing (interventions implemented as appropriate)    Goal: Discharge Needs Assessment  Outcome: Ongoing (interventions implemented as appropriate)    Goal: Interprofessional Rounds/Family Conf  Outcome: Ongoing (interventions implemented as appropriate)      Problem: Cardiac Output Decreased (Adult)  Goal: Effective Tissue Perfusion  Outcome: Ongoing (interventions implemented as appropriate)      Problem: Pain, Acute (Adult)  Goal: Acceptable Pain Control/Comfort Level  Outcome: Ongoing (interventions implemented as appropriate)      Problem: Activity Intolerance (Adult)  Goal: Activity Tolerance  Outcome: Ongoing (interventions implemented as appropriate)    Goal: Effective Energy Conservation Techniques  Outcome: Ongoing (interventions implemented as appropriate)      Problem: Diabetes, Type 2 (Adult)  Goal: Signs and Symptoms of Listed Potential Problems Will be Absent, Minimized or Managed (Diabetes, Type 2)  Outcome: Ongoing (interventions implemented as appropriate)

## 2018-06-04 VITALS
OXYGEN SATURATION: 95 % | DIASTOLIC BLOOD PRESSURE: 78 MMHG | HEIGHT: 74 IN | TEMPERATURE: 98.5 F | SYSTOLIC BLOOD PRESSURE: 129 MMHG | HEART RATE: 89 BPM | BODY MASS INDEX: 36.45 KG/M2 | RESPIRATION RATE: 18 BRPM | WEIGHT: 284 LBS

## 2018-06-04 PROBLEM — R04.2 HEMOPTYSIS: Status: RESOLVED | Noted: 2018-05-29 | Resolved: 2018-06-04

## 2018-06-04 PROBLEM — N17.9 AKI (ACUTE KIDNEY INJURY) (HCC): Status: RESOLVED | Noted: 2018-05-29 | Resolved: 2018-06-04

## 2018-06-04 LAB
ANION GAP SERPL CALCULATED.3IONS-SCNC: 14.1 MMOL/L
BUN BLD-MCNC: 27 MG/DL (ref 6–20)
BUN/CREAT SERPL: 19.4 (ref 7–25)
CALCIUM SPEC-SCNC: 9.4 MG/DL (ref 8.6–10.5)
CHLORIDE SERPL-SCNC: 101 MMOL/L (ref 98–107)
CO2 SERPL-SCNC: 27.9 MMOL/L (ref 22–29)
CREAT BLD-MCNC: 1.39 MG/DL (ref 0.76–1.27)
GFR SERPL CREATININE-BSD FRML MDRD: 56 ML/MIN/1.73
GLUCOSE BLD-MCNC: 126 MG/DL (ref 65–99)
GLUCOSE BLDC GLUCOMTR-MCNC: 104 MG/DL (ref 70–130)
GLUCOSE BLDC GLUCOMTR-MCNC: 118 MG/DL (ref 70–130)
POTASSIUM BLD-SCNC: 4.2 MMOL/L (ref 3.5–5.2)
SODIUM BLD-SCNC: 143 MMOL/L (ref 136–145)
T4 FREE SERPL-MCNC: 0.7 NG/DL (ref 0.93–1.7)

## 2018-06-04 PROCEDURE — 80048 BASIC METABOLIC PNL TOTAL CA: CPT | Performed by: INTERNAL MEDICINE

## 2018-06-04 PROCEDURE — 99232 SBSQ HOSP IP/OBS MODERATE 35: CPT | Performed by: INTERNAL MEDICINE

## 2018-06-04 PROCEDURE — 84439 ASSAY OF FREE THYROXINE: CPT | Performed by: INTERNAL MEDICINE

## 2018-06-04 PROCEDURE — 82962 GLUCOSE BLOOD TEST: CPT

## 2018-06-04 PROCEDURE — 86340 INTRINSIC FACTOR ANTIBODY: CPT | Performed by: INTERNAL MEDICINE

## 2018-06-04 PROCEDURE — 94799 UNLISTED PULMONARY SVC/PX: CPT

## 2018-06-04 RX ORDER — LEVOTHYROXINE SODIUM 0.07 MG/1
225 TABLET ORAL DAILY
Qty: 90 TABLET | Refills: 1 | Status: SHIPPED | OUTPATIENT
Start: 2018-06-04

## 2018-06-04 RX ORDER — GLIMEPIRIDE 2 MG/1
2 TABLET ORAL
Qty: 60 TABLET | Refills: 1 | Status: SHIPPED | OUTPATIENT
Start: 2018-06-04 | End: 2020-07-28

## 2018-06-04 RX ADMIN — CETIRIZINE HYDROCHLORIDE 10 MG: 10 TABLET, FILM COATED ORAL at 08:23

## 2018-06-04 RX ADMIN — DULOXETINE HYDROCHLORIDE 60 MG: 60 CAPSULE, DELAYED RELEASE ORAL at 08:23

## 2018-06-04 RX ADMIN — FAMOTIDINE 40 MG: 40 TABLET, FILM COATED ORAL at 08:23

## 2018-06-04 RX ADMIN — LISINOPRIL 10 MG: 10 TABLET ORAL at 08:23

## 2018-06-04 RX ADMIN — AMLODIPINE BESYLATE 10 MG: 10 TABLET ORAL at 08:23

## 2018-06-04 RX ADMIN — LEVOTHYROXINE SODIUM 225 MCG: 100 TABLET ORAL at 05:31

## 2018-06-04 NOTE — PLAN OF CARE
Problem: Patient Care Overview  Goal: Plan of Care Review  Outcome: Ongoing (interventions implemented as appropriate)   06/04/18 0502   Coping/Psychosocial   Plan of Care Reviewed With patient   Plan of Care Review   Progress improving   OTHER   Outcome Summary VSS. No new complaints. Continuing to monitor thyroid function levels. Labs for the morning.        Problem: Cardiac Output Decreased (Adult)  Goal: Effective Tissue Perfusion  Outcome: Ongoing (interventions implemented as appropriate)      Problem: Pain, Acute (Adult)  Goal: Acceptable Pain Control/Comfort Level  Outcome: Ongoing (interventions implemented as appropriate)      Problem: Activity Intolerance (Adult)  Goal: Activity Tolerance  Outcome: Ongoing (interventions implemented as appropriate)      Problem: Diabetes, Type 2 (Adult)  Goal: Signs and Symptoms of Listed Potential Problems Will be Absent, Minimized or Managed (Diabetes, Type 2)  Outcome: Ongoing (interventions implemented as appropriate)

## 2018-06-04 NOTE — PAYOR COMM NOTE
"oJshua Cuevas  (42 y.o. Male)     PLEASE SEE ATTACHED REVIEW.  NOT SURE IF DISCHARGING TODAY OR NOT. STILL WAITING ON CALL BACK FROM DME AND PRIMARY  TO SEE.     REF#[id number]    PLEASE CALL [phone] OR  [phone] WITH CONTINUED STAY AUTH.    THANK YOU    EVELYN CHEATHAM, SWATI, CCP    Date of Birth Social Security Number Address Home Phone MRN    1975  [address] [place] [address] [phone] [id number]    Rastafari Marital Status          None        Admission Date Admission Type Admitting Provider Attending Provider Department, Room/Bed    5/29/18 Urgent Ralph Hall MD Ramaswamy, Rajanna B, MD [address], 537/1    Discharge Date Discharge Disposition Discharge Destination                       Attending Provider:  Ralph Hall MD    Allergies:  Penicillins    Isolation:  None   Infection:  None   Code Status:  FULL    Ht:  188 cm (74.02\")   Wt:  129 kg (284 lb)    Admission Cmt:  AETNA   Principal Problem:  Pericardial effusion [I31.3]                 Active Insurance as of 5/29/2018     Primary Coverage     Payor Plan Insurance Group Employer/Plan Group    [employer] BLUE CROSS [employer] BLUE CROSS BLUE [employer]IELD PPO [id number]     Payor Plan Address Payor Plan Phone Number Effective From Effective To    [address] [phone] 4/1/2018     [facility] [id number]       Subscriber Name Subscriber Birth Date Member ID       JOSHUA CUEVAS 1975 [id number]                 Emergency Contacts      (Rel.) Home Phone Work Phone Mobile Phone    Mei Cuevas (Spouse) [phone] -- --              Intake & Output (last day)       06/03 0701 - 06/04 0700 06/04 0701 - 06/05 0700    P.O. 480     Total Intake(mL/kg) 480 (3.7)     Net +480            Unmeasured Urine Occurrence 2 x         Orders (last 24 hrs)     Start     Ordered    06/04/18 1005  T4, Free  STAT      06/04/18 1005    06/04/18 0828  Intrinsic Factor Ab  Morning Draw   "    06/02/18 1129    06/03/18 0000  Oxygen Therapy      06/03/18 1213    05/29/18 1700  POC Glucose 4x Daily AC & at Bedtime  4 Times Daily Before Meals & at Bedtime      05/29/18 1309             Physician Progress Notes (last 24 hours) (Notes from 6/3/2018  3:14 PM through 6/4/2018  3:14 PM)      Seth Killian MD at 6/4/2018  1:58 PM          42 y.o.   LOS: 6 days   Patient Care Team:  MIKEL Shi as PCP - General (Family Medicine)  Matthew Hannon MD as Consulting Physician (Hematology and Oncology)  MIKEL Shi as Referring Physician (Family Medicine)    Chief Complaint:  Severe hypothyroidism         Subjective   Patient reports feeling well and wants to go home.  No other major overnight events.    Interval History:    Review of Systems:   Review of Systems   Constitutional: Negative for appetite change and fatigue.   Eyes: Negative for visual disturbance.   Respiratory: Negative for shortness of breath.    Cardiovascular: Negative for palpitations.   Gastrointestinal: Negative for nausea and vomiting.   Endocrine: Negative for polydipsia and polyuria.   Musculoskeletal: Positive for arthralgias.   Skin: Negative for pallor.   Neurological: Positive for weakness. Negative for numbness.   Psychiatric/Behavioral: Negative for agitation.     Objective     Vital Signs   Temp:  [98.2 °F (36.8 °C)-98.5 °F (36.9 °C)] 98.5 °F (36.9 °C)  Heart Rate:  [] 87  Resp:  [18-20] 18  BP: (116-129)/(85-97) 120/96    Physical Exam:  Physical Exam   Constitutional: He is oriented to person, place, and time. He appears well-nourished.   obese   HENT:   Head: Normocephalic and atraumatic.   Wide neck   Eyes: Conjunctivae and EOM are normal.   Neck: Normal range of motion. Neck supple. Carotid bruit is not present. No thyromegaly present.   Acanthosis nigricans   Cardiovascular: Normal rate and normal heart sounds.    Pulmonary/Chest: Effort normal and breath sounds normal. No stridor. No  respiratory distress.   Abdominal: Soft. Bowel sounds are normal. He exhibits no distension. There is no tenderness.   Central obesity   Musculoskeletal: He exhibits no edema or tenderness.   Neurological: He is alert and oriented to person, place, and time.   Skin: Skin is warm and dry.   Psychiatric: He has a normal mood and affect. His behavior is normal.   Vitals reviewed.  Results Review:     I reviewed the patient's new clinical results and summarized them in subjective and in plan.      Lab Results (last 24 hours)     Procedure Component Value Units Date/Time    POC Glucose Once [[id number]]  (Normal) Collected:  06/04/18 1117    Specimen:  Blood Updated:  06/04/18 1144     Glucose 104 mg/dL     Narrative:       Meter: [id number] : [id number] Virgil LEDEZMA    T4, Free [[id number]]  (Abnormal) Collected:  06/04/18 0503    Specimen:  Blood Updated:  06/04/18 1034     Free T4 0.70 (L) ng/dL     Intrinsic Factor Ab [[id number]] Collected:  06/04/18 0854    Specimen:  Blood Updated:  06/04/18 0936    Basic Metabolic Panel [[id number]]  (Abnormal) Collected:  06/04/18 0503    Specimen:  Blood Updated:  06/04/18 0619     Glucose 126 (H) mg/dL      BUN 27 (H) mg/dL      Creatinine 1.39 (H) mg/dL      Sodium 143 mmol/L      Potassium 4.2 mmol/L      Chloride 101 mmol/L      CO2 27.9 mmol/L      Calcium 9.4 mg/dL      eGFR Non African Amer 56 (L) mL/min/1.73      BUN/Creatinine Ratio 19.4     Anion Gap 14.1 mmol/L     Narrative:       GFR Normal >60  Chronic Kidney Disease <60  Kidney Failure <15    POC Glucose Once [[id number]]  (Normal) Collected:  06/04/18 0533    Specimen:  Blood Updated:  06/04/18 0534     Glucose 118 mg/dL     Narrative:       Meter: [id number] : [id number] Dwain Fleming RN    POC Glucose Once [[id number]]  (Abnormal) Collected:  06/03/18 2056    Specimen:  Blood Updated:  06/03/18 2057     Glucose 167 (H) mg/dL     Narrative:       Meter: [id number] : [id number] Cuco LEDEZMA     POC Glucose Once [[id number]]  (Normal) Collected:  06/03/18 1602    Specimen:  Blood Updated:  06/03/18 1603     Glucose 119 mg/dL     Narrative:       Meter: [id number] : [id number] Ray Betancourt CNA        Imaging Results (last 24 hours)     ** No results found for the last 24 hours. **          Medication Review: done      Current Facility-Administered Medications:   •  acetaminophen (TYLENOL) tablet 650 mg, 650 mg, Oral, Q4H PRN, Ralph Hall MD, 650 mg at 06/02/18 0827  •  amLODIPine (NORVASC) tablet 10 mg, 10 mg, Oral, Daily, Ralph Hall MD, 10 mg at 06/04/18 0823  •  bisacodyl (DULCOLAX) suppository 10 mg, 10 mg, Rectal, Daily PRN, Ralph Hall MD  •  cetirizine (zyrTEC) tablet 10 mg, 10 mg, Oral, Daily, Ralph Hall MD, 10 mg at 06/04/18 0823  •  dextrose (D50W) solution 25 g, 25 g, Intravenous, Q15 Min PRN, Ralph Hall MD  •  dextrose (GLUTOSE) oral gel 15 g, 15 g, Oral, Q15 Min PRN, Ralph Hall MD  •  DULoxetine (CYMBALTA) DR capsule 60 mg, 60 mg, Oral, Daily, Ralph Hall MD, 60 mg at 06/04/18 0823  •  enoxaparin (LOVENOX) syringe 40 mg, 40 mg, Subcutaneous, Q24H, Ralph Hall MD, 40 mg at 06/03/18 1416  •  famotidine (PEPCID) tablet 40 mg, 40 mg, Oral, Daily, Ralph Hall MD, 40 mg at 06/04/18 0823  •  glucagon (human recombinant) (GLUCAGEN DIAGNOSTIC) injection 1 mg, 1 mg, Subcutaneous, PRN, Ralph Hall MD  •  insulin aspart (novoLOG) injection 0-9 Units, 0-9 Units, Subcutaneous, 4x Daily With Meals & Nightly, Ralph Hall MD, 2 Units at 06/03/18 2127  •  insulin detemir (LEVEMIR) injection 37 Units, 37 Units, Subcutaneous, Nightly, Cezar Kim MD, 37 Units at 06/03/18 2127  •  levothyroxine (SYNTHROID, LEVOTHROID) tablet 225 mcg, 225 mcg, Oral, Q AM, Cezar Kim MD, 225 mcg at 06/04/18 0531  •  lisinopril (PRINIVIL,ZESTRIL) tablet 10 mg, 10 mg, Oral, Q24H, Agus Hanks MD, 10 mg at 06/04/18  0823  •  nicotine (NICODERM CQ) 21 MG/24HR patch 1 patch, 1 patch, Transdermal, Q24H, Liu Alfaro MD, 1 patch at 06/03/18 2002  •  sodium chloride 0.9 % flush 1-10 mL, 1-10 mL, Intravenous, PRN, Ralph Hall MD    Assessment/Plan     Active Hospital Problems (** Indicates Principal Problem)    Diagnosis Date Noted   • **Pericardial effusion [I31.3] 05/28/2018   • 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     Severe hypothyroidism - resulting in pericardial effusion.   Okay for discharge from endocrine perspective  Advised the patient that she could resume work but he has to take it slow.  He could definitely have low energy levels and some shortness of breath in the beginning.  His thyroid levels are noted to continue to improve.      Type 2 diabetes mellitus  Continue levemir 35 units at bedtime.  Continue the NovoLog sliding scale 3 times a day before meals and at bedtime.    Discharge instructions from endocrine  Patient will be discharged on metformin thousand milligrams twice daily.  Glimiperide 2 mg oral twice daily with meals.   Okay for discharge on levothyroxine 225 µg oral daily.  Follow-up with me in the next 4-6 weeks upon discharge.    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    Am cortisol levels - wnl.      Hypogonadotropic hypogonadism  Will defer this workup for outpatient.      Discussed plan with Nurse.  "    Seth Killian MD.  06/04/18  3:57 PM      EMR Dragon / transcription disclaimer:    \"Dictated utilizing Dragon dictation\".     Electronically signed by Seth Killian MD at 6/4/2018  2:01 PM         All medication doses during the admission are shown, including meds that are no longer on order.   Scheduled Meds Sorted by Name   for Joshua Sanchez as of 6/4/18 through 6/4/18     1 Day 3 Days 7 Days 10 Days < Today >    Legend:                          Inactive     Active     Other Encounter    Linked               Medications 06/04/18   amLODIPine (NORVASC) tablet 10 mg  Dose: 10 mg  Freq: Daily Route: PO  Start: 05/29/18 1345    Admin Instructions:   Caution: Look alike/sound alike drug alert. Avoid grapefruit juice.    0823                          cetirizine (zyrTEC) tablet 10 mg  Dose: 10 mg  Freq: Daily Route: PO  Start: 05/29/18 1345    0823                          DULoxetine (CYMBALTA) DR capsule 60 mg  Dose: 60 mg  Freq: Daily Route: PO  Start: 05/29/18 1345    Admin Instructions:   Caution: Look alike/sound alike drug alert Do not crush or chew capsule.    0823                          enoxaparin (LOVENOX) syringe 30 mg  Dose: 30 mg  Freq: Every 24 Hours Route: SC  Indications of Use: PROPHYLAXIS OF VENOUS THROMBOEMBOLISM  Start: 05/29/18 1345 End: 05/30/18 1129    Admin Instructions:   Give subcutaneous in abdomen only. Do not massage site after injection. Do not change dose time until after 48 hrs.       enoxaparin (LOVENOX) syringe 40 mg  Dose: 40 mg  Freq: Every 24 Hours Route: SC  Indications of Use: PROPHYLAXIS OF VENOUS THROMBOEMBOLISM  Start: 05/30/18 1345    Admin Instructions:   Give subcutaneous in abdomen only. Do not massage site after injection. Do not change dose time until after 48 hrs.    1345                          famotidine (PEPCID) tablet 40 mg  Dose: 40 mg  Freq: Daily Route: PO  Start: 05/29/18 1345    0823                          insulin aspart (novoLOG) injection 0-9 " Units  Dose: 0-9 Units  Freq: 4 Times Daily With Meals & Nightly Route: SC  Start: 05/29/18 1345    Admin Instructions:   Correction - Moderate Dose.  40-60 units/day total insulin dose or average weight, on oral agents    Blood glucose 150-199 mg/dL - 2 units  Blood glucose 200-249 mg/dL - 4 units  Blood glucose 250-299 mg/dL - 6 units  Blood glucose 300-349 mg/dL - 7 units  Blood glucose 350-400 mg/dL - 8 units  Blood glucose greater than 400 mg/dL - 9 units and call provider        ([id number])   ([id number])   1800   2100                    insulin aspart (novoLOG) injection 0-9 Units  Dose: 0-9 Units  Freq: 4 Times Daily Before Meals & Nightly Route: SC  Start: 05/29/18 0000 End: 05/29/18 1153    Admin Instructions:   Correction - Moderate Dose.  40-60 units/day total insulin dose or average weight, on oral agents    Blood glucose 150-199 mg/dL - 2 units  Blood glucose 200-249 mg/dL - 4 units  Blood glucose 250-299 mg/dL - 6 units  Blood glucose 300-349 mg/dL - 7 units  Blood glucose 350-400 mg/dL - 8 units  Blood glucose greater than 400 mg/dL - 9 units and call provider         insulin detemir (LEVEMIR) injection 30 Units  Dose: 30 Units  Freq: Nightly Route: SC  Start: 05/29/18 2100 End: 05/30/18 1602       insulin detemir (LEVEMIR) injection 35 Units  Dose: 35 Units  Freq: Nightly Route: SC  Start: 05/30/18 2100 End: 06/02/18 1129       insulin detemir (LEVEMIR) injection 37 Units  Dose: 37 Units  Freq: Nightly Route: SC  Start: 06/02/18 2100 2100                          iopamidol (ISOVUE-370) 76 % injection 100 mL  Dose: 100 mL  Freq: Once in Imaging Route: IV  Start: 05/28/18 2103 End: 05/28/18 2103       iopamidol (ISOVUE-370) 76 % injection 50 mL  Dose: 50 mL  Freq: Once in Imaging Route: IV  Start: 05/28/18 2103 End: 05/28/18 2103       levothyroxine (SYNTHROID, LEVOTHROID) tablet 225 mcg  Dose: 225 mcg  Freq: Every Early Morning Route: PO  Start: 06/03/18 0600    Admin Instructions:   Take on empty stomach.     0531                          levothyroxine sodium injection 175 mcg  Dose: 175 mcg  Freq: Daily at 1100 Route: IV  Start: 05/29/18 1615 End: 05/31/18 1015    Admin Instructions:   Reconstitute with 5mL normal saline to give concentration of 20 mcg/mL. Shake well after reconstitution and discard unused portion.       levothyroxine sodium injection 200 mcg  Dose: 200 mcg  Freq: Daily at 1100 Route: IV  Start: 06/02/18 1138 End: 06/02/18 1151    Admin Instructions:   Reconstitute with 5mL normal saline to give concentration of 20 mcg/mL. Shake well after reconstitution and discard unused portion.  DISCONTINUE after today's dose       levothyroxine sodium injection 200 mcg  Dose: 200 mcg  Freq: Daily at 1100 Route: IV  Start: 05/31/18 1100 End: 06/02/18 1129    Admin Instructions:   Reconstitute with 5mL normal saline to give concentration of 20 mcg/mL. Shake well after reconstitution and discard unused portion.       liothyronine (CYTOMEL) tablet 10 mcg  Dose: 10 mcg  Freq: 2 Times Daily With Meals Route: PO  Start: 05/29/18 1800 End: 05/31/18 1050       liothyronine (CYTOMEL) tablet 15 mcg  Dose: 15 mcg  Freq: 2 Times Daily With Meals Route: PO  Start: 05/31/18 1800 End: 06/02/18 1129       lisinopril (PRINIVIL,ZESTRIL) tablet 10 mg  Dose: 10 mg  Freq: Every 24 Hours Scheduled Route: PO  Start: 05/31/18 1200    0823                          nicotine (NICODERM CQ) 21 MG/24HR patch 1 patch  Dose: 1 patch  Freq: Every 24 Hours Route: TD  Start: 05/29/18 2100    Admin Instructions:   Apply to clean, dry, nonhairy area of skin (typically upper arm or shoulder)      2002 2100                        nicotine (NICODERM CQ) 21 MG/24HR patch 1 patch  Dose: 1 patch  Freq: Every 24 Hours Route: TD  Start: 05/28/18 2345 End: 05/29/18 1153    Admin Instructions:   Apply patch to clean, dry, hairless area daily. Remove old patch before applying new patch.   Rotate patch site daily. May remove patch at bedtime if needed to  prevent insomnia. Do not use other nicotine products. At discharge, follow instructions on package.         perflutren (DEFINITY) 8.476 mg in sodium chloride 0.9 % 10 mL injection  Dose: 3 mL  Freq: Once Route: IV  Start: 06/01/18 1215 End: 06/01/18 1100    Admin Instructions:   Mix 1.3 mL of mechanically activated Definity with 8.7 mL of normal saline. A total of 3 mL of the resulting Definity solution was administered.       sodium chloride 0.9 % bolus 1,000 mL  Dose: 1,000 mL  Freq: Once Route: IV  Last Dose: Stopped (05/29/18 0004)  Start: 05/28/18 1928 End: 05/29/18 0004       Medications 06/04/18       Continuous Meds Sorted by Name   for Joshua Sanchez as of 6/4/18 through 6/4/18    Legend:                          Inactive     Active     Other Encounter    Linked               Medications 06/04/18       PRN Meds Sorted by Name   for Joshua Sanchez as of 6/4/18 through 6/4/18    Legend:                          Inactive     Active     Other Encounter    Linked               Medications 06/04/18   acetaminophen (TYLENOL) 160 MG/5ML solution 650 mg  Dose: 650 mg  Freq: Every 4 Hours PRN Route: PO  PRN Reason: Mild Pain   Start: 05/28/18 2305 End: 05/29/18 1153    Admin Instructions:   Do not exceed 4 grams of acetaminophen in a 24 hr period.    If given for pain, use the following pain scale:   Mild Pain = Pain Score of 1-3, CPOT 1-2  Moderate Pain = Pain Score of 4-6, CPOT 3-4  Severe Pain = Pain Score of 7-10, CPOT 5-8       acetaminophen (TYLENOL) tablet 650 mg  Dose: 650 mg  Freq: Every 4 Hours PRN Route: PO  PRN Reason: Mild Pain   Start: 06/02/18 0816    Admin Instructions:   Do not exceed 4 grams of acetaminophen in a 24 hr period.    If given for pain, use the following pain scale:   Mild Pain = Pain Score of 1-3, CPOT 1-2  Moderate Pain = Pain Score of 4-6, CPOT 3-4  Severe Pain = Pain Score of 7-10, CPOT 5-8       acetaminophen (TYLENOL) tablet 650 mg  Dose: 650 mg  Freq: Every 4 Hours PRN Route:  PO  PRN Reason: Mild Pain   Start: 05/28/18 2305 End: 05/29/18 1153    Admin Instructions:   Do not exceed 4 grams of acetaminophen in a 24 hr period.    If given for pain, use the following pain scale:   Mild Pain = Pain Score of 1-3, CPOT 1-2  Moderate Pain = Pain Score of 4-6, CPOT 3-4  Severe Pain = Pain Score of 7-10, CPOT 5-8       aluminum-magnesium hydroxide-simethicone (MAALOX MAX) 400-400-40 MG/5ML suspension 15 mL  Dose: 15 mL  Freq: Every 6 Hours PRN Route: PO  PRN Reason: Heartburn  Start: 05/28/18 2305 End: 05/29/18 1153    Admin Instructions:   Maximum 60 mL in 24 hours.       bisacodyl (DULCOLAX) suppository 10 mg  Dose: 10 mg  Freq: Daily PRN Route: RE  PRN Reason: Constipation  Start: 05/29/18 1306       dextrose (D50W) solution 25 g  Dose: 25 g  Freq: Every 15 Minutes PRN Route: IV  PRN Reason: Low Blood Sugar  PRN Comment: Blood Sugar Less Than 70  Start: 05/29/18 1308    Admin Instructions:   Blood sugar less than 70; patient has IV access - Unresponsive, NPO or Unable To Safely Swallow       dextrose (D50W) solution 25 g  Dose: 25 g  Freq: Every 15 Minutes PRN Route: IV  PRN Reason: Low Blood Sugar  PRN Comment: Blood Sugar Less Than 70  Start: 05/28/18 2325 End: 05/29/18 1153    Admin Instructions:   Blood sugar less than 70; patient has IV access - Unresponsive, NPO or Unable To Safely Swallow       dextrose (GLUTOSE) oral gel 15 g  Dose: 15 g  Freq: Every 15 Minutes PRN Route: PO  PRN Reason: Low Blood Sugar  PRN Comment: Blood sugar less than 70  Start: 05/29/18 1308    Admin Instructions:   BS<70, Patient Alert, Is not NPO, Can safely swallow.       dextrose (GLUTOSE) oral gel 15 g  Dose: 15 g  Freq: Every 15 Minutes PRN Route: PO  PRN Reason: Low Blood Sugar  PRN Comment: Blood sugar less than 70  Start: 05/28/18 2325 End: 05/29/18 1153    Admin Instructions:   BS<70, Patient Alert, Is not NPO, Can safely swallow.       glucagon (GLUCAGEN) injection 1 mg  Dose: 1 mg  Freq: As Needed  Route: SC  PRN Comment: Blood Glucose Less Than 70  Start: 05/28/18 2325 End: 05/29/18 1153    Admin Instructions:   Blood Glucose Less Than 70 - Patient Without IV Access - Unresponsive, NPO or Unable To Safely Swallow       glucagon (human recombinant) (GLUCAGEN DIAGNOSTIC) injection 1 mg  Dose: 1 mg  Freq: As Needed Route: SC  PRN Comment: Blood Glucose Less Than 70  Start: 05/29/18 1308    Admin Instructions:   Blood Glucose Less Than 70 - Patient Without IV Access - Unresponsive, NPO or Unable To Safely Swallow       ondansetron (ZOFRAN) injection 4 mg  Dose: 4 mg  Freq: Every 6 Hours PRN Route: IV  PRN Reasons: Nausea,Vomiting  Start: 05/28/18 2305 End: 05/29/18 1153    Admin Instructions:   If BOTH ondansetron (ZOFRAN) and promethazine (PHENERGAN) are ordered use ondansetron first and THEN promethazine IF ondansetron is ineffective.       sodium chloride 0.9 % flush 1-10 mL  Dose: 1-10 mL  Freq: As Needed Route: IV  PRN Reason: Line Care  Start: 05/29/18 1304       sodium chloride 0.9 % flush 1-10 mL  Dose: 1-10 mL  Freq: As Needed Route: IV  PRN Reason: Line Care  Start: 05/28/18 2259 End: 05/29/18 1153       sodium chloride 0.9 % flush 10 mL  Dose: 10 mL  Freq: As Needed Route: IV  PRN Reason: Line Care  Start: 05/28/18 1811 End: 05/29/18 1153       sodium chloride 0.9 % infusion 40 mL  Dose: 40 mL  Freq: As Needed Route: IV  PRN Reason: Line Care  Start: 05/28/18 2259 End: 05/29/18 1153    Admin Instructions:   Following administration of an IV intermittent medication, flush line with 40mL NS at 100mL/hr from a 250mL bag.       Medications 06/04/18

## 2018-06-04 NOTE — PROGRESS NOTES
Continued Stay Note  [facility]     Patient Name: Joshua Sanchez  MRN: [id number]  Today's Date: 6/4/2018    Admit Date: 5/29/2018          Discharge Plan     Row Name 06/04/18 1524       Plan    Plan Home with wife and o2 from [facility] which will be delivered to home    Patient/Family in Agreement with Plan yes    Plan Comments CCP spoke with patient at bedside and he requests [facility] to call his wife to arrange delivery and questions regarding co-pay. CCP called Violette/[facility] she states pt will have 20%copay until deductible has been met then covered at 100%, but anticipates with hospital bill it will be covered. CCP explained anticipated dc today and they will call wife to arrange. CCP spoke with Mei/Hospitalist coordinator to confirm dc today. Mahnaz valente/ccp    Row Name 06/04/18 1400       Plan    Plan Home with wife and O2 at hs with [facility]    Patient/Family in Agreement with Plan yes    Plan Comments CCP spoke with Michelle/Shannan (p#[phone]/ fax#[phone]) for referral and she requesting clinicals to be faxed and notified at time of dc. She will call back if needed. Mahnaz VALENTE/CCP              Discharge Codes    No documentation.           Paula Rodriguez RN

## 2018-06-04 NOTE — PROGRESS NOTES
Continued Stay Note  [facility]     Patient Name: Joshua Sanchez  MRN: [id number]  Today's Date: 6/4/2018    Admit Date: 5/29/2018          Discharge Plan     Row Name 06/04/18 0910       Plan    Plan Home with wife and Nocturnal o2 setup    Plan Comments CCP called [facility] for [facility] [phone], left  for referral requesting call back to this CCP. Await call back to fax clinicals for arrangement. CCP to follow. Mahnaz VALENTE/CCP              Discharge Codes    No documentation.           Paula Rodriguez, RN

## 2018-06-04 NOTE — PROGRESS NOTES
42 y.o.   LOS: 6 days   Patient Care Team:  MIKEL Shi as PCP - General (Family Medicine)  Matthew Hannon MD as Consulting Physician (Hematology and Oncology)  MIKEL Shi as Referring Physician (Family Medicine)    Chief Complaint:  Severe hypothyroidism         Subjective   Patient reports feeling well and wants to go home.  No other major overnight events.    Interval History:    Review of Systems:   Review of Systems   Constitutional: Negative for appetite change and fatigue.   Eyes: Negative for visual disturbance.   Respiratory: Negative for shortness of breath.    Cardiovascular: Negative for palpitations.   Gastrointestinal: Negative for nausea and vomiting.   Endocrine: Negative for polydipsia and polyuria.   Musculoskeletal: Positive for arthralgias.   Skin: Negative for pallor.   Neurological: Positive for weakness. Negative for numbness.   Psychiatric/Behavioral: Negative for agitation.     Objective     Vital Signs   Temp:  [98.2 °F (36.8 °C)-98.5 °F (36.9 °C)] 98.5 °F (36.9 °C)  Heart Rate:  [] 87  Resp:  [18-20] 18  BP: (116-129)/(85-97) 120/96    Physical Exam:  Physical Exam   Constitutional: He is oriented to person, place, and time. He appears well-nourished.   obese   HENT:   Head: Normocephalic and atraumatic.   Wide neck   Eyes: Conjunctivae and EOM are normal.   Neck: Normal range of motion. Neck supple. Carotid bruit is not present. No thyromegaly present.   Acanthosis nigricans   Cardiovascular: Normal rate and normal heart sounds.    Pulmonary/Chest: Effort normal and breath sounds normal. No stridor. No respiratory distress.   Abdominal: Soft. Bowel sounds are normal. He exhibits no distension. There is no tenderness.   Central obesity   Musculoskeletal: He exhibits no edema or tenderness.   Neurological: He is alert and oriented to person, place, and time.   Skin: Skin is warm and dry.   Psychiatric: He has a normal mood and affect. His behavior is  normal.   Vitals reviewed.  Results Review:     I reviewed the patient's new clinical results and summarized them in subjective and in plan.      Glucose   Date/Time Value Ref Range Status   06/04/2018 0503 126 (H) 65 - 99 mg/dL Final   06/03/2018 0411 108 (H) 65 - 99 mg/dL Final   06/02/2018 0451 141 (H) 65 - 99 mg/dL Final     Lab Results (last 24 hours)     Procedure Component Value Units Date/Time    POC Glucose Once [[id number]]  (Normal) Collected:  06/04/18 1117    Specimen:  Blood Updated:  06/04/18 1144     Glucose 104 mg/dL     Narrative:       Meter: [id number] : [id number] Virgil LEDEZMA    T4, Free [[id number]]  (Abnormal) Collected:  06/04/18 0503    Specimen:  Blood Updated:  06/04/18 1034     Free T4 0.70 (L) ng/dL     Intrinsic Factor Ab [[id number]] Collected:  06/04/18 0854    Specimen:  Blood Updated:  06/04/18 0936    Basic Metabolic Panel [[id number]]  (Abnormal) Collected:  06/04/18 0503    Specimen:  Blood Updated:  06/04/18 0619     Glucose 126 (H) mg/dL      BUN 27 (H) mg/dL      Creatinine 1.39 (H) mg/dL      Sodium 143 mmol/L      Potassium 4.2 mmol/L      Chloride 101 mmol/L      CO2 27.9 mmol/L      Calcium 9.4 mg/dL      eGFR Non African Amer 56 (L) mL/min/1.73      BUN/Creatinine Ratio 19.4     Anion Gap 14.1 mmol/L     Narrative:       GFR Normal >60  Chronic Kidney Disease <60  Kidney Failure <15    POC Glucose Once [[id number]]  (Normal) Collected:  06/04/18 0533    Specimen:  Blood Updated:  06/04/18 0534     Glucose 118 mg/dL     Narrative:       Meter: [id number] : [id number] Dwain Fleming RN    POC Glucose Once [[id number]]  (Abnormal) Collected:  06/03/18 2056    Specimen:  Blood Updated:  06/03/18 2057     Glucose 167 (H) mg/dL     Narrative:       Meter: [id number] : [id number] Cuco LEDEZMA    POC Glucose Once [[id number]]  (Normal) Collected:  06/03/18 1602    Specimen:  Blood Updated:  06/03/18 1603     Glucose 119 mg/dL     Narrative:       Meter:  [id number] : [id number] Ray Betancourt CNA        Imaging Results (last 24 hours)     ** No results found for the last 24 hours. **          Medication Review: done      Current Facility-Administered Medications:   •  acetaminophen (TYLENOL) tablet 650 mg, 650 mg, Oral, Q4H PRN, Ralph Hall MD, 650 mg at 06/02/18 0827  •  amLODIPine (NORVASC) tablet 10 mg, 10 mg, Oral, Daily, Ralph Hall MD, 10 mg at 06/04/18 0823  •  bisacodyl (DULCOLAX) suppository 10 mg, 10 mg, Rectal, Daily PRN, Ralph Hall MD  •  cetirizine (zyrTEC) tablet 10 mg, 10 mg, Oral, Daily, Ralph Hall MD, 10 mg at 06/04/18 0823  •  dextrose (D50W) solution 25 g, 25 g, Intravenous, Q15 Min PRN, Ralph Hall MD  •  dextrose (GLUTOSE) oral gel 15 g, 15 g, Oral, Q15 Min PRN, Ralph Hall MD  •  DULoxetine (CYMBALTA) DR capsule 60 mg, 60 mg, Oral, Daily, Ralph Hall MD, 60 mg at 06/04/18 0823  •  enoxaparin (LOVENOX) syringe 40 mg, 40 mg, Subcutaneous, Q24H, Ralph Hall MD, 40 mg at 06/03/18 1416  •  famotidine (PEPCID) tablet 40 mg, 40 mg, Oral, Daily, Ralph Hall MD, 40 mg at 06/04/18 0823  •  glucagon (human recombinant) (GLUCAGEN DIAGNOSTIC) injection 1 mg, 1 mg, Subcutaneous, PRN, Ralph Hall MD  •  insulin aspart (novoLOG) injection 0-9 Units, 0-9 Units, Subcutaneous, 4x Daily With Meals & Nightly, Ralph Hall MD, 2 Units at 06/03/18 2127  •  insulin detemir (LEVEMIR) injection 37 Units, 37 Units, Subcutaneous, Nightly, Cezar Kim MD, 37 Units at 06/03/18 2127  •  levothyroxine (SYNTHROID, LEVOTHROID) tablet 225 mcg, 225 mcg, Oral, Q AM, Cezar Kim MD, 225 mcg at 06/04/18 0531  •  lisinopril (PRINIVIL,ZESTRIL) tablet 10 mg, 10 mg, Oral, Q24H, Agus Hanks MD, 10 mg at 06/04/18 0823  •  nicotine (NICODERM CQ) 21 MG/24HR patch 1 patch, 1 patch, Transdermal, Q24H, Liu Alfaro MD, 1 patch at 06/03/18 2002  •  sodium chloride 0.9 %  "flush 1-10 mL, 1-10 mL, Intravenous, PRN, Ralph Hall MD    Assessment/Plan     Active Hospital Problems (** Indicates Principal Problem)    Diagnosis Date Noted   • **Pericardial effusion [I31.3] 05/28/2018   • 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     Severe hypothyroidism - resulting in pericardial effusion.   Okay for discharge from endocrine perspective  Advised the patient that she could resume work but he has to take it slow.  He could definitely have low energy levels and some shortness of breath in the beginning.  His thyroid levels are noted to continue to improve.      Type 2 diabetes mellitus  Continue levemir 35 units at bedtime.  Continue the NovoLog sliding scale 3 times a day before meals and at bedtime.    Discharge instructions from endocrine  Patient will be discharged on metformin thousand milligrams twice daily.  Glimiperide 2 mg oral twice daily with meals.   Okay for discharge on levothyroxine 225 µg oral daily.  Follow-up with me in the next 4-6 weeks upon discharge.    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    Am cortisol levels - wnl.      Hypogonadotropic hypogonadism  Will defer this workup for outpatient.      Discussed plan with Nurse.     Seth Killian MD.  06/04/18  3:57 PM      EMR Dragon / transcription disclaimer:    \"Dictated utilizing Dragon dictation\".   "

## 2018-06-04 NOTE — PROGRESS NOTES
Continued Stay Note  [facility]     Patient Name: Joshua Sanchez  MRN: [id number]  Today's Date: 6/4/2018    Admit Date: 5/29/2018          Discharge Plan     Row Name 06/04/18 1400       Plan    Plan Home with wife and O2 at  with Respamed    Patient/Family in Agreement with Plan yes    Plan Comments CCP spoke with Michelle/Shannan (p#[phone] [phone]/ fax#[phone]) for referral and she requesting clinicals to be faxed and notified at time of dc. She will call back if needed. Mahnaz VALENTE/CCP              Discharge Codes    No documentation.           Paula Rodriguez, RN

## 2018-06-05 LAB — C PEPTIDE SERPL-MCNC: 3.2 NG/ML (ref 1.1–4.4)

## 2018-06-05 NOTE — PAYOR COMM NOTE
"Joshua Cuevas  (42 y.o. Male)     PLEASE SEE ATTACHED DC SUMMARY    REF#[id number]    THANK YOU    EVELYN CHEATHAM LPN CCP      Date of Birth Social Security Number Address Home Phone MRN    1975  [address] [place] [address] [phone] [id number]    Oriental orthodox Marital Status          None        Admission Date Admission Type Admitting Provider Attending Provider Department, Room/Bed    18 Urgent Ralph Hall MD  [address], 537/1    Discharge Date Discharge Disposition Discharge Destination        2018 Home or Self Care              Attending Provider:  (none)   Allergies:  Penicillins    Isolation:  None   Infection:  None   Code Status:  Prior    Ht:  188 cm (74.02\")   Wt:  129 kg (284 lb)    Admission Cmt:  AETNA   Principal Problem:  Pericardial effusion [I31.3]                 Active Insurance as of 2018     Primary Coverage     Payor Plan Insurance Group Employer/Plan Group    ANTHEM BLUE CROSS ANTHEM BLUE CROSS BLUE SHIELD O [id number]     Payor Plan Address Payor Plan Phone Number Effective From Effective To    [address] [phone] 2018     [facility] [id number]       Subscriber Name Subscriber Birth Date Member ID       JOSHUA CUEVAS 1975 [id number]                 Emergency Contacts      (Rel.) Home Phone Work Phone Mobile Phone    Mei Cuevas (Spouse) [phone] -- --               Discharge Summary      Ralph Hall MD at 2018  4:03 PM                 Name: Joshua Cuevas  Age: 42 y.o.  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] 2018     Priority: High   • Primary " hypothyroidism [E03.9] 05/29/2018     Priority: High   • Cardiomyopathy [I42.9] 05/29/2018   • Type 2 diabetes mellitus [E11.9] 05/29/2018   • HYACINTH (obstructive sleep apnea) [G47.33] 05/29/2018   • Alopecia totalis [L63.0] 05/29/2018   • Rheumatoid arthritis [M06.9] 05/29/2018   • Chest pain [R07.9] 05/29/2018      Resolved Hospital Problems    Diagnosis Date Noted Date Resolved   • Hemoptysis [R04.2] 05/29/2018 06/04/2018   • LORENZA (acute kidney injury) [N17.9] 05/29/2018 06/04/2018       SECONDARY DIAGNOSES  Past Medical History:   Diagnosis Date   • Adjustment disorder with mixed anxiety and depressed mood    • Alopecia totalis    • H/O Testicular hypofunction    • Hypertension    • Hypothyroidism    • Morbid obesity    • Osteoarthritis     Knee   • Rheumatoid arthritis    • Sleep apnea    • Type 2 diabetes mellitus    • Vitamin D deficiency        CONSULTS   Seth Killian MD, endocrinology  Agus Hanks MD cardiology    PROCEDURES PERFORMED  Echocardiogram      HOSPITAL COURSE  This is a 42-year-old gentleman was originally admitted to [facility] with a history of having cough with some bloody sputum and weakness.  Further investigation led to the finding of profound hypothyroidism myxedema and alopecia totalis.  He also had his CT which showed pericardial effusion with no tamponade.  Patient was transferred to [facility] for further care as there is no endocrinology covarge at [facility].  His hemoptysis resolved without any treatment.  He does have symptoms of sleep apnea with snoring and nighttime desaturation.  His TSH is more than 100 and both free T4 and T3 were very low.  The endocrinologist started him on IV levothyroxine and manage his diabetes with both oral hypoglycemic agents and insulin.  Unfortunately patient was unable to take his medications as he has lost insurance.  Now he has his insurance and works 2 jobs.  At present patient is stable and will be discharged home  with instructions to resume his work slowly and watch for fatigue and shortness of breath.  He also is instructed to follow-up with endocrinology, cardiology.  In addition he is going to home oxygen at nighttime to be used until his sleep studies done.  Arrangement has been done for him to follow-up at the [place] sleep center.      PHYSICAL EXAM  Temp:  [98.2 °F (36.8 °C)-98.5 °F (36.9 °C)] 98.5 °F (36.9 °C)  Heart Rate:  [] 89  Resp:  [18] 18  BP: (120-129)/(78-96) 129/78  Body mass index is 36.45 kg/m².  Physical Exam  HEENT: Unremarkable, pupils are round equal and reacting to light, patient has no body hair  NECK: No lymphadenopathy, throat is clear,   RESPRATORY SYSTEM: Breath sounds are equal on both sides and are normal, no wheezes or crackles  CARDIOVASULAR SYSTEM: Heart rate is regular without murmur  ABDOMEN: Soft, no ascites, no hepatosplenomegaly.  EXTREMITIES: No cyanosis, clubbing or edema    CONDITION ON DISCHARGE  Stable.      DISCHARGE DISPOSITION   Home      ALLERGIES  Allergies   Allergen Reactions   • Penicillins Other (See Comments)     Lips turn blue       RECENT LABS    Results from last 7 days  Lab Units 06/03/18  0411 05/29/18  0301 05/28/18  1745   WBC 10*3/mm3  --  12.97* 13.01*   HEMOGLOBIN g/dL  --  14.6 15.6   HEMATOCRIT %  --  42.4 44.8   PLATELETS 10*3/mm3 235 242 284       Results from last 7 days  Lab Units 06/04/18  0503 06/03/18  0411 06/02/18  0451   SODIUM mmol/L 143 142 142   POTASSIUM mmol/L 4.2 3.9 4.0   CHLORIDE mmol/L 101 100 101   CO2 mmol/L 27.9 30.4* 28.8   BUN mg/dL 27* 23* 23*   CREATININE mg/dL 1.39* 1.45* 1.57*   GLUCOSE mg/dL 126* 108* 141*   CALCIUM mg/dL 9.4 9.4 9.2       Results from last 7 days  Lab Units 05/29/18  0301   INR  0.96       Results from last 7 days  Lab Units 06/04/18  0503 06/03/18  0411  05/29/18  0301   TSH mIU/mL  --   --   --  >100.010*   T3 FREE pg/mL  --  2.27  < >  --    FREE T4 ng/dL 0.70* 0.68*  < > <0.10*   < > = values in this  interval not displayed.    DIET;  Diet Order   Procedures   • Diet Regular; Consistent Carbohydrate, Cardiac       DISCHARGE MEDICATIONS     Your medication list      START taking these medications      Instructions Last Dose Given Next Dose Due   glimepiride 2 MG tablet  Commonly known as:  AMARYL      Take 1 tablet by mouth 2 (Two) Times a Day Before Meals.       insulin aspart 100 UNIT/ML injection  Commonly known as:  novoLOG      Inject 0-9 Units under the skin 4 (Four) Times a Day With Meals & at Bedtime.       insulin detemir 100 UNIT/ML injection  Commonly known as:  LEVEMIR      Inject 35 Units under the skin Every Night.       metFORMIN 1000 MG tablet  Commonly known as:  GLUCOPHAGE      2 tablets twice a day          CHANGE how you take these medications      Instructions Last Dose Given Next Dose Due   levothyroxine 75 MCG tablet  Commonly known as:  SYNTHROID, LEVOTHROID  What changed:  · medication strength  · how much to take  · Another medication with the same name was removed. Continue taking this medication, and follow the directions you see here.      Take 3 tablets by mouth Daily.          CONTINUE taking these medications      Instructions Last Dose Given Next Dose Due   amLODIPine 10 MG tablet  Commonly known as:  NORVASC      Take 10 mg by mouth Daily.       DULoxetine 60 MG capsule  Commonly known as:  CYMBALTA      Take 60 mg by mouth Daily.       fenofibrate 160 MG tablet      Take 160 mg by mouth Daily.       lisinopril 10 MG tablet  Commonly known as:  PRINIVIL,ZESTRIL      Take 10 mg by mouth Daily.       loratadine 10 MG tablet  Commonly known as:  CLARITIN      Take 10 mg by mouth Daily.          STOP taking these medications    hydrochlorothiazide 12.5 MG tablet  Commonly known as:  HYDRODIURIL              Where to Get Your Medications      These medications were sent to [place] [address] AT [facility]  &  [address] - [phone] [facility] [phone] FX   [address] [id number]    Phone:  [phone]   · glimepiride 2 MG tablet  · insulin aspart 100 UNIT/ML injection  · insulin detemir 100 UNIT/ML injection  · levothyroxine 75 MCG tablet  · metFORMIN 1000 MG tablet        No future appointments.  Follow-up Information     Seth Killian MD Follow up.    Specialty:  Endocrinology  Why:  Call to make an appointment to see in  4 - 6 weeks.  Contact information:  [address] [facility] [address]  [facility] [address] [phone]             Otto Antony, MIKEL Follow up.    Specialty:  Family Medicine  Contact information:  [address] [facility] [id number] [phone]             Ralph Hall MD Follow up in 2 month(s).    Specialties:  Pulmonary Disease, Sleep Medicine, Internal Medicine  Why:  after the sleep study  Contact information:  [address] [facility] [address]  [facility] [id number] [phone]             Agus Hanks MD Follow up in 3 week(s).    Specialty:  Cardiology  Why:  at [place] office  Contact information:  [address] [facility] [address]  [facility] [id number] [phone]                   TEST  RESULTS PENDING AT DISCHARGE  None   Order Current Status    C-Peptide In process    Glutamic Acid Decarboxylase In process    Intrinsic Factor Ab In process           CODE STATUS  Full Code        Ralph Hall MD  [facility]  06/04/18      Time: greater than 35 minutes.    Electronically signed by Ralph Hall MD at 6/4/2018  4:10 PM

## 2018-06-05 NOTE — PROGRESS NOTES
Case Management Discharge Note    Final Note: Pt d/c home with wife via car.    Destination     No service coordination in this encounter.      Durable Medical Equipment     No service coordination in this encounter.      Dialysis/Infusion     No service coordination in this encounter.      Home Medical Care     No service coordination in this encounter.      Social Care     No service coordination in this encounter.        Other: Other (car)

## 2018-06-06 LAB
GAD65 AB SER-ACNC: <5 U/ML (ref 0–5)
IF BLOCK AB SER-ACNC: 1 AU/ML (ref 0–1.1)

## 2018-07-05 ENCOUNTER — HOSPITAL ENCOUNTER (OUTPATIENT)
Dept: SLEEP MEDICINE | Facility: HOSPITAL | Age: 43
Discharge: HOME | End: 2018-07-05
Attending: INTERNAL MEDICINE

## 2018-07-05 DIAGNOSIS — G47.33 OBSTRUCTIVE SLEEP APNEA SYNDROME: ICD-10-CM

## 2018-07-17 ENCOUNTER — OFFICE VISIT (OUTPATIENT)
Dept: CARDIOLOGY | Facility: CLINIC | Age: 43
End: 2018-07-17

## 2018-07-17 ENCOUNTER — LAB (OUTPATIENT)
Dept: LAB | Facility: HOSPITAL | Age: 43
End: 2018-07-17
Attending: INTERNAL MEDICINE

## 2018-07-17 VITALS
HEART RATE: 80 BPM | BODY MASS INDEX: 34.38 KG/M2 | SYSTOLIC BLOOD PRESSURE: 106 MMHG | DIASTOLIC BLOOD PRESSURE: 84 MMHG | WEIGHT: 267.9 LBS | HEIGHT: 74 IN

## 2018-07-17 DIAGNOSIS — E03.9 MYXEDEMA HEART DISEASE: Primary | ICD-10-CM

## 2018-07-17 DIAGNOSIS — I31.39 PERICARDIAL EFFUSION: ICD-10-CM

## 2018-07-17 DIAGNOSIS — E03.9 MYXEDEMA HEART DISEASE: ICD-10-CM

## 2018-07-17 DIAGNOSIS — E31.0 POLYGLANDULAR AUTOIMMUNE SYNDROME, TYPE 2 (HCC): ICD-10-CM

## 2018-07-17 DIAGNOSIS — I51.9 MYXEDEMA HEART DISEASE: Primary | ICD-10-CM

## 2018-07-17 DIAGNOSIS — I51.9 MYXEDEMA HEART DISEASE: ICD-10-CM

## 2018-07-17 DIAGNOSIS — I42.9 CARDIOMYOPATHY, UNSPECIFIED TYPE (HCC): ICD-10-CM

## 2018-07-17 PROBLEM — R07.9 CHEST PAIN: Status: RESOLVED | Noted: 2018-05-29 | Resolved: 2018-07-17

## 2018-07-17 LAB — TSH SERPL DL<=0.05 MIU/L-ACNC: 1.3 MIU/ML (ref 0.27–4.2)

## 2018-07-17 PROCEDURE — 36415 COLL VENOUS BLD VENIPUNCTURE: CPT

## 2018-07-17 PROCEDURE — 84443 ASSAY THYROID STIM HORMONE: CPT

## 2018-07-17 PROCEDURE — 99213 OFFICE O/P EST LOW 20 MIN: CPT | Performed by: INTERNAL MEDICINE

## 2018-07-17 PROCEDURE — 93000 ELECTROCARDIOGRAM COMPLETE: CPT | Performed by: INTERNAL MEDICINE

## 2018-07-17 NOTE — PROGRESS NOTES
Date of Office Visit: 2018  Encounter Provider: Agus Hanks MD  Place of Service: [facility] CARDIOLOGY  Patient Name: Joshua Sanchez  :1975    Chief Complaint   Patient presents with   • Cardiomyopathy     HOSPITAL FOLLOW UP    :     HPI: Joshua Sanchez is a 43 y.o. male who presents today to follow up.  He was admitted to [facility] in 2018 with hemoptysis, and then transferred to [facility] for profound hypothyroidism.  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  He has RA and alopecia totalis as well.  His LVEF was 25-30% upon initial check, and there was a moderate sized pericardial effusion.  He was started on thyroid replacement and his LVEF was 40% prior to discharge.    He feels so much better!  His fatigue has resolved.  He denies dyspnea, orthopnea, PND, edema, palpitations, or syncope.  He does sweat a lot.  He denies chest pain.  He has returned to smoking.  He is back at work.    Past Medical History:   Diagnosis Date   • Adjustment disorder with mixed anxiety and depressed mood    • Alopecia totalis    • H/O Testicular hypofunction    • Hypertension    • Hypothyroidism    • Morbid obesity (CMS/HCC)    • Myxedema heart disease (CMS/HCC) 2018    pericardial effusion and cardiomyopathy, EF was 25-30%, then 40%   • Osteoarthritis     Knee   • Polyglandular autoimmune syndrome, type 2 (CMS/HCC) 2018   • Rheumatoid arthritis (CMS/HCC)    • Sleep apnea    • Type 2 diabetes mellitus (CMS/HCC)    • Vitamin D deficiency        Past Surgical History:   Procedure Laterality Date   • APPENDECTOMY     • FOOT NEUROMA SURGERY Right ,    • WISDOM TOOTH EXTRACTION         Social History     Social History   • Marital status:      Spouse name: Mei   • Number of children: 3   • Years of education: [employer]     Occupational History   •  [employer]     Social History Main Topics   • Smoking status: Current Every  Day Smoker     Packs/day: 1.00     Years: 25.00     Types: Cigarettes   • Smokeless tobacco: Former User     Quit date: 7/17/1988      Comment: Started at age 17   • Alcohol use No   • Drug use: No   • Sexual activity: Defer     Other Topics Concern   • Not on file     Social History Narrative   • No narrative on file       Family History   Problem Relation Age of Onset   • No Known Problems Sister    • No Known Problems Daughter    • No Known Problems Son    • No Known Problems Sister    • No Known Problems Daughter        Review of Systems   Constitution: Negative for malaise/fatigue.   Cardiovascular: Negative for chest pain, leg swelling and palpitations.   Respiratory: Negative for shortness of breath.    Neurological: Negative for light-headedness.   All other systems reviewed and are negative.      Allergies   Allergen Reactions   • Penicillins Other (See Comments)     Lips turn blue         Current Outpatient Prescriptions:   •  amLODIPine (NORVASC) 10 MG tablet, Take 10 mg by mouth Daily., Disp: , Rfl:   •  DULoxetine (CYMBALTA) 60 MG capsule, Take 60 mg by mouth Daily., Disp: , Rfl:   •  fenofibrate 160 MG tablet, Take 160 mg by mouth Daily., Disp: , Rfl:   •  glimepiride (AMARYL) 2 MG tablet, Take 1 tablet by mouth 2 (Two) Times a Day Before Meals., Disp: 60 tablet, Rfl: 1  •  levothyroxine (SYNTHROID, LEVOTHROID) 75 MCG tablet, Take 3 tablets by mouth Daily., Disp: 90 tablet, Rfl: 1  •  lisinopril (PRINIVIL,ZESTRIL) 10 MG tablet, Take 10 mg by mouth Daily., Disp: , Rfl:   •  metFORMIN (GLUCOPHAGE) 1000 MG tablet, 2 tablets twice a day, Disp: 120 tablet, Rfl: 1  •  insulin aspart (novoLOG) 100 UNIT/ML injection, Inject 0-9 Units under the skin 4 (Four) Times a Day With Meals & at Bedtime., Disp: 10 mL, Rfl: 1  •  insulin detemir (LEVEMIR) 100 UNIT/ML injection, Inject 35 Units under the skin Every Night., Disp: 4 pen, Rfl: 1  •  loratadine (CLARITIN) 10 MG tablet, Take 10 mg by mouth Daily., Disp: , Rfl:  "      Objective:     Vitals:    07/17/18 1451   BP: 106/84   Pulse: 80   Weight: 122 kg (267 lb 14.4 oz)   Height: 188 cm (74.02\")     Body mass index is 34.38 kg/m².    Physical Exam   Constitutional: He is oriented to person, place, and time.   Obese   HENT:   Head: Normocephalic.   Nose: Nose normal.   Mouth/Throat: Oropharynx is clear and moist.   Eyes: Conjunctivae and EOM are normal. Pupils are equal, round, and reactive to light.   Neck: Normal range of motion.   Cannot assess for JVD due to habitus   Cardiovascular: Normal rate, regular rhythm, normal heart sounds and intact distal pulses.    No murmur heard.  Pulmonary/Chest: Effort normal.   Abdominal: Soft.   Obesity limits abdominal exam   Musculoskeletal: Normal range of motion. He exhibits no edema.   Neurological: He is alert and oriented to person, place, and time. No cranial nerve deficit.   Skin: Skin is warm and dry. No rash noted.   Psychiatric: He has a normal mood and affect. His behavior is normal. Judgment and thought content normal.   Vitals reviewed.        ECG 12 Lead  Date/Time: 7/17/2018 3:14 PM  Performed by: ROLAND CASTRO  Authorized by: ROLAND CASTRO   Comparison: compared with previous ECG   Similar to previous ECG  Rhythm comments: sinus arrhythmia  Conduction: conduction normal  ST Segments: ST segments normal  T Waves: T waves normal  QRS axis: normal  Other: no other findings  Clinical impression: normal ECG              Assessment:       Diagnosis Plan   1. Myxedema heart disease (CMS/HCC)  Adult Transthoracic Echo Complete W/ Cont if Necessary Per Protocol    TSH   2. Pericardial effusion     3. Cardiomyopathy, unspecified type (CMS/HCC)     4. Polyglandular autoimmune syndrome, type 2 (CMS/HCC)            Plan:       I am going to repeat an echocardiogram and a TSH.  If his LVEF is still low despite normalization of TSH, then he will need an ischemic evaluation.  His effusion should resolve with thyroid replacement.    He has " decided not to follow up with endocrinology but will follow up with his new PMD instead.     Sincerely,       Agus Hanks MD

## 2020-07-27 ENCOUNTER — TELEPHONE (OUTPATIENT)
Dept: CARDIOLOGY | Facility: CLINIC | Age: 45
End: 2020-07-27

## 2020-07-27 NOTE — TELEPHONE ENCOUNTER
I have contacted the hospital scheduling department to get started on the echo disc and called pt's pcp office and left a vm to request any lab work.      Note: pt's wife stated that he only had a cbc at the hospital.

## 2020-07-27 NOTE — TELEPHONE ENCOUNTER
Pt's wife called;     Pt has an appt with you on 08/04 for pre op for hip surgery. Pt was to have the surgery tomorrow, but due to the echo they rescheduled it for 08/14.  Pt's last OV was 07/17/18 dx pericardial effusion.  Pt is soa and recently had a pre op echo at [facility], which I have sent to STAT scanning.      Note:  The interp LV systolic function appears to be 35%-40%.  Apperars to be global left ventricular hypokinesis.   Mild left atrial and right atrial enlargement.   Trace mitral and tricuspid regurgitation.  No valvular stenosis  This is a moderate pericardial effusion with maximum size of 2.1 cm.  Overall unchanged from previous echo in 2018 with no clear evidence of tamponade.       Does pt need to go back to the ED? Or get sooner appt with any  APC?

## 2020-07-27 NOTE — PROGRESS NOTES
Date of Office Visit: 2020  Encounter Provider: MIKEL Kirkpatrick  Place of Service: [facility] CARDIOLOGY  Patient Name: Joshua Sanchez  :1975  Primary Cardiologist: Dr. Hanks    CC: cardiac risk assessement    Dear Dr. Polanco    HPI: Joshua Sanchez is a pleasant 45 y.o. male who presents 2020 for cardiac follow up. HE is a new patient to me and I have reviewed his past medical records.  He was last seen in 2018.    HPI: Joshua Sanchez is a 43 y.o. male who presents today to follow up.  He was admitted to [facility] in 2018 with hemoptysis, and then transferred to [facility] for profound hypothyroidism.  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  He has RA and alopecia totalis as well.  His LVEF was 25-30% upon initial check, and there was a moderate sized pericardial effusion.  He was started on thyroid replacement and his LVEF was 40% prior to discharge.    He states about 6 weeks ago acute left hip pain.  He started going to the chiropractor that did not help, he had an x-ray that was negative.  The chiropractor suggested an MRI.  He then went for the MRI that showed a hip fracture.  He then saw Dr. Cristina on the  and was then scheduled for a left hip replacement for today.  His wife states that he was diagnosed with avascular necrosis.  He had an EKG and an echo performed prior to surgery.  The echo was performed at [facility] and is been scanned into the record.  The conclusion was moderate to severe left ventricular dysfunction, EF is 35% to 40%.  Mild left atrial and right atrial enlargement.  Trace mitral and tricuspid regurgitation.  No valvular stenosis.  There is a moderate pericardial effusion with maximum size of 2.1 cm.  Overall, unchanged from previous echocardiogram in 2018 with no clear evidence of tamponade.  There is mild right atrial flattening noted on subcostal imaging.    Patient denies any palpitations,  shortness of breath, lower extremity edema.  He states he has not had any dizziness, lightheadedness, syncope or presyncopal episodes.  He denies any chest pain or pressure.  He denies any fatigue.  He works as a  at the [email or web address] and up until 1 week ago he was still working unknowingly on a fractured left hip.  He currently smokes.  His wife states that he snores loudly and has multiple apneic episodes every night.  He had a sleep study 2 years ago and was diagnosed with sleep apnea and fitted with a mask.  However he did not get a CPAP machine and therefore has not been treated for the last 2 years.  He does not know where the breakdown happened.  At one point he was on amlodipine but that got stopped.  He states he recently was started on lisinopril 10 mg.  He is only been on that for 2 weeks.  His blood pressure is quite high.  I am restarting the amlodipine as well and have asked the patient to take 1 of his medications in the morning and the second 1 in the evening. I received a copy of CBC and BMP from [facility].  CBC unremarkable.  BMP with glucose 142, BUN 27, creatinine 1.32, otherwise unremarkable.  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   • Rheumatoid arthritis (CMS/HCC)    • Sleep apnea    • Type 2 diabetes mellitus (CMS/HCC)    • Vitamin D deficiency        Past Surgical History:   Procedure Laterality Date   • APPENDECTOMY  2003   • FOOT NEUROMA SURGERY Right 2016, 2017   • WISDOM TOOTH EXTRACTION         Social History     Socioeconomic History   • Marital status:      Spouse name: Mei   •  Number of children: 3   • Years of education: GED   • Highest education level: Not on file   Occupational History     Employer: [employer]   Tobacco Use   • Smoking status: Current Every Day Smoker     Packs/day: 1.00     Years: 25.00     Pack years: 25.00     Types: Cigarettes   • Smokeless tobacco: Former User     Quit date: 7/17/1988   • Tobacco comment: Started at age 17   Substance and Sexual Activity   • Alcohol use: No   • Drug use: No   • Sexual activity: Defer       Family History   Problem Relation Age of Onset   • No Known Problems Sister    • No Known Problems Daughter    • No Known Problems Son    • No Known Problems Sister    • No Known Problems Daughter        The following portion of the patient's history were reviewed and updated as appropriate: past medical history, past surgical history, past social history, past family history, allergies, current medications, and problem list.    Review of Systems   Constitution: Negative for diaphoresis, fever and malaise/fatigue.   HENT: Negative for congestion, hearing loss, hoarse voice, nosebleeds and sore throat.    Eyes: Negative for photophobia, vision loss in left eye, vision loss in right eye and visual disturbance.   Cardiovascular: Positive for orthopnea and paroxysmal nocturnal dyspnea. Negative for chest pain, dyspnea on exertion, irregular heartbeat, leg swelling, near-syncope, palpitations and syncope.   Respiratory: Positive for sleep disturbances due to breathing and snoring. Negative for cough, hemoptysis, shortness of breath, sputum production and wheezing.    Endocrine: Negative for cold intolerance, heat intolerance, polydipsia, polyphagia and polyuria.   Hematologic/Lymphatic: Negative for bleeding problem. Does not bruise/bleed easily.   Skin: Negative for color change, dry skin, poor wound healing, rash and suspicious lesions.   Musculoskeletal: Positive for joint pain (left hip). Negative for arthritis, back pain,  "falls, gout, joint swelling, muscle cramps, muscle weakness and myalgias.          Walking with a crutch, limping   Gastrointestinal: Negative for bloating, abdominal pain, constipation, diarrhea, dysphagia, melena, nausea and vomiting.   Neurological: Negative for excessive daytime sleepiness, dizziness, headaches, light-headedness, loss of balance, numbness, paresthesias, seizures, vertigo and weakness.   Psychiatric/Behavioral: Negative for depression, memory loss and substance abuse. The patient is not nervous/anxious.        Allergies   Allergen Reactions   • Penicillins Other (See Comments)     Lips turn blue         Current Outpatient Medications:   •  atorvastatin (LIPITOR) 10 MG tablet, Take 10 mg by mouth Daily., Disp: , Rfl:   •  HYDROcodone-acetaminophen (NORCO) 7.5-325 MG per tablet, TK 1 T PO Q 4 TO 6 HOURS PRN FOR PAIN, Disp: , Rfl:   •  levothyroxine (SYNTHROID, LEVOTHROID) 75 MCG tablet, Take 3 tablets by mouth Daily. (Patient taking differently: Take 125 mcg by mouth Daily.), Disp: 90 tablet, Rfl: 1  •  lisinopril (PRINIVIL,ZESTRIL) 10 MG tablet, Take 10 mg by mouth Daily., Disp: , Rfl:   •  vitamin D (ERGOCALCIFEROL) 1.25 MG (90266 UT) capsule capsule, Take 50,000 Units by mouth., Disp: , Rfl:   •  amLODIPine (NORVASC) 10 MG tablet, Take 1 tablet by mouth Daily., Disp: 90 tablet, Rfl: 3        Objective:     Vitals:    07/28/20 1522   BP: (!) 140/108   Pulse: 101   Resp: 16   SpO2: 98%   Weight: 134 kg (295 lb)   Height: 188 cm (74\")     Body mass index is 37.88 kg/m².      Physical Exam   Constitutional: He is oriented to person, place, and time. He appears well-developed and well-nourished. No distress.   overweight   HENT:   Head: Normocephalic and atraumatic.   Right Ear: External ear normal.   Left Ear: External ear normal.   Nose: Nose normal.   Eyes: Pupils are equal, round, and reactive to light. Conjunctivae are normal. Right eye exhibits no discharge. Left eye exhibits no discharge. "   Neck: Normal range of motion. Neck supple. No JVD present. No tracheal deviation present. No thyromegaly present.   Cardiovascular: Normal rate, regular rhythm, normal heart sounds and intact distal pulses. Exam reveals no gallop and no friction rub.   No murmur heard.  Pulmonary/Chest: Effort normal and breath sounds normal. No respiratory distress. He has no wheezes. He has no rales. He exhibits no tenderness.   Abdominal: Soft. Bowel sounds are normal. He exhibits no distension. There is no tenderness.   Musculoskeletal: Normal range of motion. He exhibits no edema, tenderness or deformity.   Walking with a limp, using crutches   Lymphadenopathy:     He has no cervical adenopathy.   Neurological: He is alert and oriented to person, place, and time. Coordination normal.   Skin: Skin is warm and dry. No rash noted. No erythema.   Psychiatric: He has a normal mood and affect. His behavior is normal. Judgment and thought content normal.             ECG 12 Lead  Date/Time: 7/28/2020 3:30 PM  Performed by: Alethea Ivan APRN  Authorized by: Alethea Ivan APRN   Comparison: compared with previous ECG from 7/17/2018  Similar to previous ECG  Rhythm: sinus rhythm  Rate: normal  Conduction: conduction normal  ST Segments: ST segments normal  T Waves: T waves normal  QRS axis: right (borderline right axis deviation)  Other findings: prolonged QTc interval  Other findings comments: Borderline QT prolongation    Clinical impression: non-specific ECG              Assessment:       Diagnosis Plan   1. Preop cardiovascular exam     2. Cardiomyopathy, unspecified type (CMS/HCC)     3. Pericardial effusion     4. Essential hypertension     5. Severe obesity (BMI 35.0-39.9) with comorbidity (CMS/HCC)     6. HYACINTH (obstructive sleep apnea)            Plan:       1.  Cardiac risk assessment - await Dr. Hanks's perusal of echo disk from [facility].    2/3.  Cardiomyopathy/pericardial effusion- Recent echo from [facility] with EF 35-40% with 2.1 cm moderate pericardial effusion. Dr. Hanks has asked for the disc to review for her self. He has not been on any GDMT.  He was just started on lisinopril 10 mg 2 weeks ago.     4.  HTN - not controlled.  Just started lisinopril 10 mg 2 weeks ago.  He had been on amlodipine 10 mg in the past but does not know when or why that was stopped.  I have reordered that.  He will take lisinopril in the morning and the amlodipine in the evening.  I have asked him to take his blood pressure readings and keep a log.    5.  Severe obesity - he would benefit from a weight loss program.  He has gained 30 pounds in the last 2 years.    6.  HYACINTH - positive sleep study 2 years ago, he never followed up with a Cpap or visits.  Will refer to sleep medicine for follow up and HYACINTH treatment plan.    7.  Hypothyroidism - he is on replacement therapy and states he had labs about 1 month ago.    We have requested the disk of the echo from [facility].  Awaiting for Dr. Hanks's review prior to giving cardiac risk assessment.    As always, it has been a pleasure to participate in your patient's care. Thank you.       Sincerely,       MIKEL Kirkpatrick      Current Outpatient Medications:   •  atorvastatin (LIPITOR) 10 MG tablet, Take 10 mg by mouth Daily., Disp: , Rfl:   •  HYDROcodone-acetaminophen (NORCO) 7.5-325 MG per tablet, TK 1 T PO Q 4 TO 6 HOURS PRN FOR PAIN, Disp: , Rfl:   •  levothyroxine (SYNTHROID, LEVOTHROID) 75 MCG tablet, Take 3 tablets by mouth Daily. (Patient taking differently: Take 125 mcg by mouth Daily.), Disp: 90 tablet, Rfl: 1  •  lisinopril (PRINIVIL,ZESTRIL) 10 MG tablet, Take 10 mg by mouth Daily., Disp: , Rfl:   •  vitamin D (ERGOCALCIFEROL) 1.25 MG (40852 UT) capsule capsule, Take 50,000 Units by mouth., Disp: , Rfl:   •  amLODIPine (NORVASC) 10 MG tablet, Take 1 tablet by mouth Daily., Disp: 90 tablet, Rfl: 3    Dictated utilizing Dragon dictation

## 2020-07-27 NOTE — TELEPHONE ENCOUNTER
We will need a copy of this study on disc, delivered to the main office.  I need his most recent thyroid labs (all labs actually).    He needs an appointment with JF end of this week (he's a [facility] patient).    NICOLASA

## 2020-07-28 ENCOUNTER — OFFICE VISIT (OUTPATIENT)
Dept: CARDIOLOGY | Facility: CLINIC | Age: 45
End: 2020-07-28

## 2020-07-28 VITALS
WEIGHT: 295 LBS | HEART RATE: 101 BPM | BODY MASS INDEX: 37.86 KG/M2 | HEIGHT: 74 IN | OXYGEN SATURATION: 98 % | RESPIRATION RATE: 16 BRPM | SYSTOLIC BLOOD PRESSURE: 140 MMHG | DIASTOLIC BLOOD PRESSURE: 108 MMHG

## 2020-07-28 DIAGNOSIS — G47.33 OSA (OBSTRUCTIVE SLEEP APNEA): ICD-10-CM

## 2020-07-28 DIAGNOSIS — I10 ESSENTIAL HYPERTENSION: ICD-10-CM

## 2020-07-28 DIAGNOSIS — Z01.810 PREOP CARDIOVASCULAR EXAM: Primary | ICD-10-CM

## 2020-07-28 DIAGNOSIS — E66.01 SEVERE OBESITY (BMI 35.0-39.9) WITH COMORBIDITY (HCC): ICD-10-CM

## 2020-07-28 DIAGNOSIS — I31.39 PERICARDIAL EFFUSION: ICD-10-CM

## 2020-07-28 DIAGNOSIS — I42.9 CARDIOMYOPATHY, UNSPECIFIED TYPE (HCC): ICD-10-CM

## 2020-07-28 PROCEDURE — 99214 OFFICE O/P EST MOD 30 MIN: CPT | Performed by: NURSE PRACTITIONER

## 2020-07-28 PROCEDURE — 93000 ELECTROCARDIOGRAM COMPLETE: CPT | Performed by: NURSE PRACTITIONER

## 2020-07-28 RX ORDER — ATORVASTATIN CALCIUM 10 MG/1
10 TABLET, FILM COATED ORAL DAILY
COMMUNITY
Start: 2020-06-24

## 2020-07-28 RX ORDER — ERGOCALCIFEROL 1.25 MG/1
50000 CAPSULE ORAL
Status: ON HOLD | COMMUNITY
Start: 2020-06-24 | End: 2020-08-14

## 2020-07-28 RX ORDER — HYDROCODONE BITARTRATE AND ACETAMINOPHEN 7.5; 325 MG/1; MG/1
TABLET ORAL
COMMUNITY
Start: 2020-07-15

## 2020-07-28 RX ORDER — AMLODIPINE BESYLATE 10 MG/1
10 TABLET ORAL DAILY
Qty: 90 TABLET | Refills: 3 | Status: SHIPPED | OUTPATIENT
Start: 2020-07-28

## 2020-08-04 ENCOUNTER — TELEPHONE (OUTPATIENT)
Dept: CARDIOLOGY | Facility: CLINIC | Age: 45
End: 2020-08-04

## 2020-08-04 NOTE — TELEPHONE ENCOUNTER
Pt called about his surgery clearance status.  We are still waiting on the echo disc from [facility].  I have Gia working on it, she is calling again to check the status.      Pt's hip surgery is scheduled for 08/13 with Dr. Cristina at [facility].

## 2020-08-04 NOTE — TELEPHONE ENCOUNTER
Pt went to  the echo disc from [facility] and will be mailing it to our office via priority mail.

## 2020-08-07 DIAGNOSIS — I31.39 PERICARDIAL EFFUSION: Primary | ICD-10-CM

## 2020-08-07 DIAGNOSIS — Z01.810 PREOP CARDIOVASCULAR EXAM: ICD-10-CM

## 2020-08-07 RX ORDER — CARVEDILOL 3.12 MG/1
3.12 TABLET ORAL 2 TIMES DAILY
Qty: 60 TABLET | Refills: 6 | Status: SHIPPED | OUTPATIENT
Start: 2020-08-07 | End: 2021-08-17

## 2020-08-07 NOTE — TELEPHONE ENCOUNTER
Verify his last TSH was within goal, and start carvedilol 3.125mg BID as well.    nannette quezada

## 2020-08-07 NOTE — TELEPHONE ENCOUNTER
Dr Hubbard reviewed the echo and states it was an extremely poor quality/suboptimal study.    He needs a limited echo in [place] to assess EF, and to use a respirometer to assess the hemodynamic significance of the effusion.  He cannot have surgery until this is done.    Is he on a BB, or just lisinopril?    JDK

## 2020-08-07 NOTE — TELEPHONE ENCOUNTER
Spoke to patient with update and medications.    Ольга, please get his latest labs from [facility] [place] [facility], looking for TSH.  Thanks

## 2020-08-10 ENCOUNTER — DOCUMENTATION (OUTPATIENT)
Dept: CARDIOLOGY | Facility: CLINIC | Age: 45
End: 2020-08-10

## 2020-08-10 ENCOUNTER — HOSPITAL ENCOUNTER (OUTPATIENT)
Dept: CARDIOLOGY | Facility: HOSPITAL | Age: 45
Discharge: HOME | End: 2020-08-10
Admitting: NURSE PRACTITIONER

## 2020-08-10 VITALS — BODY MASS INDEX: 37.86 KG/M2 | WEIGHT: 295 LBS | HEIGHT: 74 IN | HEART RATE: 78 BPM

## 2020-08-10 DIAGNOSIS — Z01.810 PREOP CARDIOVASCULAR EXAM: ICD-10-CM

## 2020-08-10 DIAGNOSIS — I31.39 PERICARDIAL EFFUSION: ICD-10-CM

## 2020-08-10 DIAGNOSIS — I31.39 PERICARDIAL EFFUSION: Primary | ICD-10-CM

## 2020-08-10 LAB
AORTIC ARCH: 2.9 CM
ASCENDING AORTA: 3.8 CM
BH CV ECHO MEAS - ACS: 2.4 CM
BH CV ECHO MEAS - ASC AORTA: 3.8 CM
BH CV ECHO MEAS - BSA(HAYCOCK): 2.7 M^2
BH CV ECHO MEAS - BSA: 2.6 M^2
BH CV ECHO MEAS - BZI_BMI: 37.9 KILOGRAMS/M^2
BH CV ECHO MEAS - BZI_METRIC_HEIGHT: 188 CM
BH CV ECHO MEAS - BZI_METRIC_WEIGHT: 133.8 KG
BH CV ECHO MEAS - EDV(MOD-SP2): 108 ML
BH CV ECHO MEAS - EDV(MOD-SP4): 109 ML
BH CV ECHO MEAS - EDV(TEICH): 147.7 ML
BH CV ECHO MEAS - EF(CUBED): 26.8 %
BH CV ECHO MEAS - EF(MOD-BP): 53.1 %
BH CV ECHO MEAS - EF(MOD-SP2): 53.7 %
BH CV ECHO MEAS - EF(MOD-SP4): 53.2 %
BH CV ECHO MEAS - EF(TEICH): 21.4 %
BH CV ECHO MEAS - ESV(MOD-SP2): 50 ML
BH CV ECHO MEAS - ESV(MOD-SP4): 51 ML
BH CV ECHO MEAS - ESV(TEICH): 116.1 ML
BH CV ECHO MEAS - FS: 9.9 %
BH CV ECHO MEAS - IVS/LVPW: 1.1
BH CV ECHO MEAS - IVSD: 1.4 CM
BH CV ECHO MEAS - LAT PEAK E' VEL: 5.5 CM/SEC
BH CV ECHO MEAS - LV DIASTOLIC VOL/BSA (35-75): 42.5 ML/M^2
BH CV ECHO MEAS - LV MASS(C)D: 339.2 GRAMS
BH CV ECHO MEAS - LV MASS(C)DI: 132.3 GRAMS/M^2
BH CV ECHO MEAS - LV SYSTOLIC VOL/BSA (12-30): 19.9 ML/M^2
BH CV ECHO MEAS - LVIDD: 5.5 CM
BH CV ECHO MEAS - LVIDS: 5 CM
BH CV ECHO MEAS - LVLD AP2: 7.8 CM
BH CV ECHO MEAS - LVLD AP4: 7.8 CM
BH CV ECHO MEAS - LVLS AP2: 6.3 CM
BH CV ECHO MEAS - LVLS AP4: 6.4 CM
BH CV ECHO MEAS - LVOT AREA (M): 4.2 CM^2
BH CV ECHO MEAS - LVOT AREA: 4.2 CM^2
BH CV ECHO MEAS - LVOT DIAM: 2.3 CM
BH CV ECHO MEAS - LVPWD: 1.4 CM
BH CV ECHO MEAS - MED PEAK E' VEL: 7.5 CM/SEC
BH CV ECHO MEAS - MV A DUR: 0.09 SEC
BH CV ECHO MEAS - MV A MAX VEL: 40.7 CM/SEC
BH CV ECHO MEAS - MV DEC SLOPE: 206.5 CM/SEC^2
BH CV ECHO MEAS - MV DEC TIME: 0.28 SEC
BH CV ECHO MEAS - MV E MAX VEL: 50.6 CM/SEC
BH CV ECHO MEAS - MV E/A: 1.2
BH CV ECHO MEAS - MV P1/2T MAX VEL: 50.1 CM/SEC
BH CV ECHO MEAS - MV P1/2T: 71.1 MSEC
BH CV ECHO MEAS - MVA P1/2T LCG: 4.4 CM^2
BH CV ECHO MEAS - MVA(P1/2T): 3.1 CM^2
BH CV ECHO MEAS - SI(CUBED): 17.4 ML/M^2
BH CV ECHO MEAS - SI(MOD-SP2): 22.6 ML/M^2
BH CV ECHO MEAS - SI(MOD-SP4): 22.6 ML/M^2
BH CV ECHO MEAS - SI(TEICH): 12.3 ML/M^2
BH CV ECHO MEAS - SV(CUBED): 44.7 ML
BH CV ECHO MEAS - SV(MOD-SP2): 58 ML
BH CV ECHO MEAS - SV(MOD-SP4): 58 ML
BH CV ECHO MEAS - SV(TEICH): 31.6 ML
BH CV ECHO MEASUREMENTS AVERAGE E/E' RATIO: 7.78
BH CV XLRA - TDI S': 11.2 CM/SEC
LEFT ATRIUM VOLUME INDEX: 25 ML/M2
MAXIMAL PREDICTED HEART RATE: 175 BPM
SINUS: 3.8 CM
STJ: 3.1 CM
STRESS TARGET HR: 149 BPM

## 2020-08-10 PROCEDURE — 25010000002 PERFLUTREN (DEFINITY) 8.476 MG IN SODIUM CHLORIDE 0.9 % 10 ML INJECTION: Performed by: NURSE PRACTITIONER

## 2020-08-10 PROCEDURE — 93308 TTE F-UP OR LMTD: CPT | Performed by: INTERNAL MEDICINE

## 2020-08-10 PROCEDURE — 93325 DOPPLER ECHO COLOR FLOW MAPG: CPT

## 2020-08-10 PROCEDURE — 93308 TTE F-UP OR LMTD: CPT

## 2020-08-10 PROCEDURE — 93321 DOPPLER ECHO F-UP/LMTD STD: CPT

## 2020-08-10 PROCEDURE — 93321 DOPPLER ECHO F-UP/LMTD STD: CPT | Performed by: INTERNAL MEDICINE

## 2020-08-10 PROCEDURE — 93325 DOPPLER ECHO COLOR FLOW MAPG: CPT | Performed by: INTERNAL MEDICINE

## 2020-08-10 RX ADMIN — PERFLUTREN 2 ML: 6.52 INJECTION, SUSPENSION INTRAVENOUS at 09:24

## 2020-08-10 NOTE — PROGRESS NOTES
I s/w Ramiro Ivan, MIKEL.    Given persistence of CMP and effusion, and evidence of increased intrapercardial pressure, we need to work this up prior to surgery.    I have recommended R+L cath with simultaneous pressures, cor angio, and potentially pericardiocentesis.    I s/w Dr Bermeo, who will perform the study.  I called Dr Cristina's office and left a VM about cancelling surgery this week and left my cell number.

## 2020-08-11 DIAGNOSIS — Z01.818 OTHER SPECIFIED PRE-OPERATIVE EXAMINATION: Primary | ICD-10-CM

## 2020-08-12 ENCOUNTER — LAB (OUTPATIENT)
Dept: LAB | Facility: HOSPITAL | Age: 45
End: 2020-08-12

## 2020-08-12 DIAGNOSIS — Z01.818 OTHER SPECIFIED PRE-OPERATIVE EXAMINATION: ICD-10-CM

## 2020-08-12 PROCEDURE — C9803 HOPD COVID-19 SPEC COLLECT: HCPCS

## 2020-08-12 PROCEDURE — U0004 COV-19 TEST NON-CDC HGH THRU: HCPCS

## 2020-08-12 PROCEDURE — U0002 COVID-19 LAB TEST NON-CDC: HCPCS

## 2020-08-12 NOTE — TELEPHONE ENCOUNTER
Kaleigh CHARLES info for pt's surgery if he is approved to proceed:    [phone] office  [phone] fax

## 2020-08-13 LAB
REF LAB TEST METHOD: NORMAL
SARS-COV-2 RNA RESP QL NAA+PROBE: NOT DETECTED

## 2020-08-14 ENCOUNTER — HOSPITAL ENCOUNTER (OUTPATIENT)
Facility: HOSPITAL | Age: 45
Setting detail: HOSPITAL OUTPATIENT SURGERY
Discharge: HOME | End: 2020-08-14
Attending: INTERNAL MEDICINE | Admitting: NURSE PRACTITIONER

## 2020-08-14 ENCOUNTER — APPOINTMENT (OUTPATIENT)
Dept: CARDIOLOGY | Facility: HOSPITAL | Age: 45
End: 2020-08-14

## 2020-08-14 VITALS
SYSTOLIC BLOOD PRESSURE: 139 MMHG | DIASTOLIC BLOOD PRESSURE: 99 MMHG | HEART RATE: 84 BPM | OXYGEN SATURATION: 96 % | RESPIRATION RATE: 16 BRPM | HEIGHT: 73 IN | WEIGHT: 284.6 LBS | TEMPERATURE: 97.6 F | BODY MASS INDEX: 37.72 KG/M2

## 2020-08-14 DIAGNOSIS — I31.39 PERICARDIAL EFFUSION: ICD-10-CM

## 2020-08-14 DIAGNOSIS — Z01.810 PREOP CARDIOVASCULAR EXAM: ICD-10-CM

## 2020-08-14 LAB
APPEARANCE FLD: ABNORMAL
COLOR FLD: YELLOW
HCT VFR BLDA CALC: 42 % (ref 38–51)
HGB BLDA-MCNC: 14.3 G/DL (ref 12–17)
LYMPHOCYTES NFR FLD MANUAL: 80 %
MAXIMAL PREDICTED HEART RATE: 175 BPM
METHOD: ABNORMAL
MONOS+MACROS NFR FLD: 17 %
NEUTROPHILS NFR FLD MANUAL: 3 %
NUC CELL # FLD: 332 /MM3
RBC # FLD AUTO: 677 /MM3
SAO2 % BLDA: 68 % (ref 95–98)
SAO2 % BLDA: 71 % (ref 95–98)
SAO2 % BLDA: 97 % (ref 95–98)
STRESS TARGET HR: 149 BPM

## 2020-08-14 PROCEDURE — 33016 PERICARDIOCENTESIS W/IMAGING: CPT | Performed by: INTERNAL MEDICINE

## 2020-08-14 PROCEDURE — C1769 GUIDE WIRE: HCPCS | Performed by: INTERNAL MEDICINE

## 2020-08-14 PROCEDURE — C1894 INTRO/SHEATH, NON-LASER: HCPCS | Performed by: INTERNAL MEDICINE

## 2020-08-14 PROCEDURE — 93460 R&L HRT ART/VENTRICLE ANGIO: CPT | Performed by: INTERNAL MEDICINE

## 2020-08-14 PROCEDURE — 99153 MOD SED SAME PHYS/QHP EA: CPT | Performed by: INTERNAL MEDICINE

## 2020-08-14 PROCEDURE — 25010000002 HEPARIN (PORCINE) PER 1000 UNITS: Performed by: INTERNAL MEDICINE

## 2020-08-14 PROCEDURE — 93308 TTE F-UP OR LMTD: CPT | Performed by: INTERNAL MEDICINE

## 2020-08-14 PROCEDURE — 85018 HEMOGLOBIN: CPT

## 2020-08-14 PROCEDURE — 25010000002 FENTANYL CITRATE (PF) 100 MCG/2ML SOLUTION: Performed by: INTERNAL MEDICINE

## 2020-08-14 PROCEDURE — 87070 CULTURE OTHR SPECIMN AEROBIC: CPT | Performed by: INTERNAL MEDICINE

## 2020-08-14 PROCEDURE — 99152 MOD SED SAME PHYS/QHP 5/>YRS: CPT | Performed by: INTERNAL MEDICINE

## 2020-08-14 PROCEDURE — 89051 BODY FLUID CELL COUNT: CPT | Performed by: INTERNAL MEDICINE

## 2020-08-14 PROCEDURE — 88305 TISSUE EXAM BY PATHOLOGIST: CPT | Performed by: INTERNAL MEDICINE

## 2020-08-14 PROCEDURE — 25010000002 MIDAZOLAM PER 1 MG: Performed by: INTERNAL MEDICINE

## 2020-08-14 PROCEDURE — 88112 CYTOPATH CELL ENHANCE TECH: CPT | Performed by: INTERNAL MEDICINE

## 2020-08-14 PROCEDURE — 93308 TTE F-UP OR LMTD: CPT

## 2020-08-14 PROCEDURE — 85014 HEMATOCRIT: CPT

## 2020-08-14 PROCEDURE — 87205 SMEAR GRAM STAIN: CPT | Performed by: INTERNAL MEDICINE

## 2020-08-14 PROCEDURE — 87015 SPECIMEN INFECT AGNT CONCNTJ: CPT | Performed by: INTERNAL MEDICINE

## 2020-08-14 PROCEDURE — C1729 CATH, DRAINAGE: HCPCS | Performed by: INTERNAL MEDICINE

## 2020-08-14 PROCEDURE — 0 IOPAMIDOL PER 1 ML: Performed by: INTERNAL MEDICINE

## 2020-08-14 RX ORDER — MORPHINE SULFATE 2 MG/ML
1 INJECTION, SOLUTION INTRAMUSCULAR; INTRAVENOUS EVERY 4 HOURS PRN
Status: DISCONTINUED | OUTPATIENT
Start: 2020-08-14 | End: 2020-08-14 | Stop reason: HOSPADM

## 2020-08-14 RX ORDER — LIDOCAINE HYDROCHLORIDE 20 MG/ML
INJECTION, SOLUTION INFILTRATION; PERINEURAL AS NEEDED
Status: DISCONTINUED | OUTPATIENT
Start: 2020-08-14 | End: 2020-08-14 | Stop reason: HOSPADM

## 2020-08-14 RX ORDER — SODIUM CHLORIDE 0.9 % (FLUSH) 0.9 %
10 SYRINGE (ML) INJECTION AS NEEDED
Status: DISCONTINUED | OUTPATIENT
Start: 2020-08-14 | End: 2020-08-14 | Stop reason: HOSPADM

## 2020-08-14 RX ORDER — ONDANSETRON 4 MG/1
4 TABLET, FILM COATED ORAL EVERY 6 HOURS PRN
Status: DISCONTINUED | OUTPATIENT
Start: 2020-08-14 | End: 2020-08-14 | Stop reason: HOSPADM

## 2020-08-14 RX ORDER — MIDAZOLAM HYDROCHLORIDE 1 MG/ML
INJECTION INTRAMUSCULAR; INTRAVENOUS AS NEEDED
Status: DISCONTINUED | OUTPATIENT
Start: 2020-08-14 | End: 2020-08-14 | Stop reason: HOSPADM

## 2020-08-14 RX ORDER — SODIUM CHLORIDE 9 MG/ML
75 INJECTION, SOLUTION INTRAVENOUS CONTINUOUS
Status: DISCONTINUED | OUTPATIENT
Start: 2020-08-14 | End: 2020-08-14 | Stop reason: HOSPADM

## 2020-08-14 RX ORDER — SODIUM CHLORIDE 0.9 % (FLUSH) 0.9 %
3 SYRINGE (ML) INJECTION EVERY 12 HOURS SCHEDULED
Status: DISCONTINUED | OUTPATIENT
Start: 2020-08-14 | End: 2020-08-14 | Stop reason: HOSPADM

## 2020-08-14 RX ORDER — SODIUM CHLORIDE 9 MG/ML
100 INJECTION, SOLUTION INTRAVENOUS CONTINUOUS
Status: DISCONTINUED | OUTPATIENT
Start: 2020-08-14 | End: 2020-08-14 | Stop reason: HOSPADM

## 2020-08-14 RX ORDER — ACETAMINOPHEN 325 MG/1
650 TABLET ORAL EVERY 4 HOURS PRN
Status: DISCONTINUED | OUTPATIENT
Start: 2020-08-14 | End: 2020-08-14 | Stop reason: HOSPADM

## 2020-08-14 RX ORDER — NALOXONE HCL 0.4 MG/ML
0.4 VIAL (ML) INJECTION
Status: DISCONTINUED | OUTPATIENT
Start: 2020-08-14 | End: 2020-08-14 | Stop reason: HOSPADM

## 2020-08-14 RX ORDER — HYDROCODONE BITARTRATE AND ACETAMINOPHEN 5; 325 MG/1; MG/1
1 TABLET ORAL EVERY 4 HOURS PRN
Status: DISCONTINUED | OUTPATIENT
Start: 2020-08-14 | End: 2020-08-14 | Stop reason: HOSPADM

## 2020-08-14 RX ORDER — FENTANYL CITRATE 50 UG/ML
INJECTION, SOLUTION INTRAMUSCULAR; INTRAVENOUS AS NEEDED
Status: DISCONTINUED | OUTPATIENT
Start: 2020-08-14 | End: 2020-08-14 | Stop reason: HOSPADM

## 2020-08-14 RX ORDER — ONDANSETRON 2 MG/ML
4 INJECTION INTRAMUSCULAR; INTRAVENOUS EVERY 6 HOURS PRN
Status: DISCONTINUED | OUTPATIENT
Start: 2020-08-14 | End: 2020-08-14 | Stop reason: HOSPADM

## 2020-08-14 RX ADMIN — SODIUM CHLORIDE 75 ML/HR: 9 INJECTION, SOLUTION INTRAVENOUS at 12:22

## 2020-08-14 NOTE — DISCHARGE INSTRUCTIONS
[facility]  [address] [place] [address]    Coronary Angiogram (Radial/Ulnar Approach) After Care    Refer to this sheet in the next few weeks. These instructions provide you with information on caring for yourself after your procedure. Your caregiver may also give you more specific instructions. Your treatment has been planned according to current medical practices, but problems sometimes occur. Call your caregiver if you have any problems or questions after your procedure.    Home Care Instructions:  · You may shower the day after the procedure. Remove the bandage (dressing) and gently wash the site with plain soap and water. Gently pat the site dry. You may apply a band aid daily for 2 days if desired.    · Do not apply powder or lotion to the site.  · Do not submerge the affected site in water for 3 to 5 days or until the site is completely healed.   · Do not lift, push or pull anything over 5 pounds for 5 days after your procedure. As a reference, a gallon of milk weighs 8 pounds.   · Inspect the site at least twice daily. You may notice some bruising at the site and it may be tender for 1 to 2 weeks.     · Increase your fluid intake for the next 2 days.    · Keep arm elevated for 24 hours. For the remainder of the day, keep your arm in “Pledge of Allegiance” position when up and about.     · You may drive 24 hours after the procedure unless otherwise instructed by your caregiver.  · Do not operate machinery or power tools for 24 hours.  · A responsible adult should be with you for the first 24 hours after you arrive home. Do not make any important legal decisions or sign legal papers for 24 hours.  Do not drink alcohol for 24 hours.    · Metformin or any medications containing Metformin should not be taken for 48 hours after your procedure.      Call Your Doctor if:   · You have unusual pain at the radial/ulnar (wrist) site.  · You have redness, warmth, swelling, or pain at the  radial/ulnar (wrist) site.  · You have drainage (other than a small amount of blood on the dressing).  · You have chills or a fever > 101.  · Your arm becomes pale or dark, cool, tingly, or numb.  · You have heavy bleeding from the site, hold pressure on the site for 20 minutes.  If the bleeding stops, apply a fresh bandage and call your cardiologist.  However, if you continue to have bleeding, call 911.

## 2020-08-17 LAB
BACTERIA FLD CULT: NORMAL
GRAM STN SPEC: NORMAL

## 2020-08-17 NOTE — TELEPHONE ENCOUNTER
Date of Office Visit:  2020  Encounter Provider:  Agus Hanks MD  Place of Service:  [facility]  Patient Name: Joshua Sanchez  :  1975    Attention:  Pre-anesthesia testing      To Whom it May Concern:    Mr Sanchez has nonischemic cardiomyopathy, EF 30%.  In 2020, he had a cath which revealed normal coronary arteries and normal filling pressures/normal RVSP.  He has had a chronic pericardial effusion without tamponade, and it was drained in 2020.  The fluid was benign.     He is at intermediate risk of major adverse CV events due to his obesity, other comorbidites, and reduced LVEF.  From a cardiac standpoint, it's non-modifiable as long as he is taking his beta blocker (which he was previously not doing).    He has a history of profound hypothyroidism, and despite numerous requests, we have not been able to get his last thyroid function tests from his PCP.  I would encourage PAT to make sure that his TSH is within goal before proceeding with surgery.    Please contact our office with any questions or concerns. As always, it has been a pleasure to participate in your patient's care.      Agus Hanks MD  [facility]

## 2020-08-18 ENCOUNTER — TELEPHONE (OUTPATIENT)
Dept: CARDIOLOGY | Facility: CLINIC | Age: 45
End: 2020-08-18

## 2020-08-18 LAB
CYTO UR: NORMAL
LAB AP CASE REPORT: NORMAL
PATH REPORT.FINAL DX SPEC: NORMAL
PATH REPORT.GROSS SPEC: NORMAL

## 2020-08-18 NOTE — TELEPHONE ENCOUNTER
Put another call out to Dr. Cristina's office with all three locations.  I have had to leave a vm with the locations giving Dr. Hanks's cell number.

## 2020-08-18 NOTE — TELEPHONE ENCOUNTER
I'm not sure how this is our problem as a cardiology practice, unfortunately.  [facility] has called several times for his labs and they have not been received.    I suggest they call their PCP.    Ultimately, however, they will require a letter of risk evaluation from his PCP or an endocrinologist regarding that risk.    pilar

## 2020-08-18 NOTE — TELEPHONE ENCOUNTER
----- Message from Joshua Sanchez sent at 8/17/2020 10:18 PM EDT -----  Regarding: Test Results Question  Contact: [phone]  Wondering if the results of the pericardial cytology were ok?

## 2020-08-18 NOTE — TELEPHONE ENCOUNTER
Pt called and left a vm that he would like for our office to fax his medical information to his pcp at [phone].

## 2020-08-18 NOTE — TELEPHONE ENCOUNTER
See below.   Dr. Bermeo is out this week and you are his primary cardiologist. Would you mind calling him with the results?    Thanks,  Skyla

## 2020-08-25 ENCOUNTER — PATIENT MESSAGE (OUTPATIENT)
Dept: CARDIOLOGY | Facility: CLINIC | Age: 45
End: 2020-08-25

## 2020-08-25 ENCOUNTER — TELEPHONE (OUTPATIENT)
Dept: CARDIOLOGY | Facility: CLINIC | Age: 45
End: 2020-08-25

## 2020-08-25 NOTE — TELEPHONE ENCOUNTER
----- Message from Joshua Sanchez sent at 8/25/2020  3:22 PM EDT -----  Regarding: Visit Follow-Up Question  Contact: [phone]  What do the results of the cytology mean?

## 2020-08-28 NOTE — TELEPHONE ENCOUNTER
Joshua Sanchez Christopher A, MD 3 hours ago (12:49 PM)         Thank you all so much! Dr. Bermeo was wonderful, very appreciative.

## 2020-09-01 ENCOUNTER — TELEPHONE (OUTPATIENT)
Dept: CARDIOLOGY | Facility: CLINIC | Age: 45
End: 2020-09-01

## 2020-09-01 NOTE — TELEPHONE ENCOUNTER
Received a voicemail   Hilaria with [facility] to fax pre op letter for pt's upcoming surgery on 08/04.    We have made several attempts to contact them back, but had to leave a vm.      [phone] fax  [phone] office    Another voicemail from:   Sudha with [facility]   [phone] fax   [phone] office  Dr. Beckham's office     We have attempted to reach out to them as well, but have had to leave a vm.      Note: I have faxed the note that has been prepared for Dr. Cristina after verifying the facility.

## 2021-08-17 RX ORDER — CARVEDILOL 3.12 MG/1
TABLET ORAL
Qty: 60 TABLET | Refills: 0 | Status: SHIPPED | OUTPATIENT
Start: 2021-08-17

## 2021-08-17 NOTE — TELEPHONE ENCOUNTER
Rx Refill Note  Requested Prescriptions     Pending Prescriptions Disp Refills   • carvedilol (COREG) 3.125 MG tablet [Pharmacy Med Name: CARVEDILOL 3.125MG TABLETS] 60 tablet 6     Sig: TAKE 1 TABLET BY MOUTH TWICE DAILY      Last office visit with prescribing clinician: 7/28/2020      Next office visit with prescribing clinician: Visit date not found            Ольга Gonzalez MA  08/17/21, 08:48 EDT

## 2021-08-18 ENCOUNTER — TELEPHONE (OUTPATIENT)
Dept: CARDIOLOGY | Facility: CLINIC | Age: 46
End: 2021-08-18

## 2021-08-18 NOTE — TELEPHONE ENCOUNTER
Called patient to make appointment for future refills. He has been having his family MD fill his Coreg. He is unable to schedule right now due to work and only having one vehicle.

## (undated) DEVICE — GLIDESHEATH SLENDER STAINLESS STEEL KIT: Brand: GLIDESHEATH SLENDER

## (undated) DEVICE — GW EMR FIX EXCHG J STD .035 3MM 260CM

## (undated) DEVICE — VSI MICRO-INTRODUCER KITS ARE INTENDED FOR USE IN PERCUTANEOUS INTRODUCTION OF UP TO A 0.018 INCH OR 0.038 INCH GUIDEWIRE OR CATHETER INTO THE VASCULAR SYSTEM FOLLOWING A SMALL GAUGE NEEDLE STICK.: Brand: VSI MICRO-INTRODUCER KIT

## (undated) DEVICE — GLIDESHEATH BASIC HYDROPHILIC COATED INTRODUCER SHEATH: Brand: GLIDESHEATH

## (undated) DEVICE — CATH DIAG IMPULSE FR5 5F 100CM

## (undated) DEVICE — TRY CATH PERICARDIOCENT PERIVAC 8.3F

## (undated) DEVICE — KT MANIFLD CARDIAC

## (undated) DEVICE — PK CATH CARD 40

## (undated) DEVICE — CATH DIAG IMPULSE PIG 5F 100CM

## (undated) DEVICE — CATH DIAG IMPULSE FL3.5 5F 100CM

## (undated) DEVICE — BALN PRESS WEDGE 5F 110CM